# Patient Record
Sex: FEMALE | Race: WHITE | NOT HISPANIC OR LATINO | Employment: OTHER | URBAN - METROPOLITAN AREA
[De-identification: names, ages, dates, MRNs, and addresses within clinical notes are randomized per-mention and may not be internally consistent; named-entity substitution may affect disease eponyms.]

---

## 2021-04-11 ENCOUNTER — APPOINTMENT (EMERGENCY)
Dept: RADIOLOGY | Facility: HOSPITAL | Age: 71
DRG: 871 | End: 2021-04-11
Payer: MEDICARE

## 2021-04-11 ENCOUNTER — HOSPITAL ENCOUNTER (INPATIENT)
Facility: HOSPITAL | Age: 71
LOS: 2 days | Discharge: HOME | DRG: 871 | End: 2021-04-13
Attending: EMERGENCY MEDICINE | Admitting: FAMILY MEDICINE
Payer: MEDICARE

## 2021-04-11 ENCOUNTER — APPOINTMENT (EMERGENCY)
Dept: CT IMAGING | Facility: HOSPITAL | Age: 71
DRG: 871 | End: 2021-04-11
Payer: MEDICARE

## 2021-04-11 DIAGNOSIS — R65.20 SEVERE SEPSIS (HCC): ICD-10-CM

## 2021-04-11 DIAGNOSIS — E66.01 CLASS 3 SEVERE OBESITY DUE TO EXCESS CALORIES WITH SERIOUS COMORBIDITY IN ADULT, UNSPECIFIED BMI (HCC): ICD-10-CM

## 2021-04-11 DIAGNOSIS — J18.9 PNEUMONIA: Primary | ICD-10-CM

## 2021-04-11 DIAGNOSIS — A41.9 SEVERE SEPSIS (HCC): ICD-10-CM

## 2021-04-11 DIAGNOSIS — G93.41 ENCEPHALOPATHY IN SEPSIS: ICD-10-CM

## 2021-04-11 PROBLEM — M32.9 LUPUS (HCC): Status: ACTIVE | Noted: 2021-04-11

## 2021-04-11 PROBLEM — E11.9 TYPE 2 DIABETES MELLITUS (HCC): Status: ACTIVE | Noted: 2021-04-11

## 2021-04-11 PROBLEM — E87.8 ELECTROLYTE ABNORMALITY: Status: ACTIVE | Noted: 2021-04-11

## 2021-04-11 PROBLEM — R77.8 ELEVATED TROPONIN: Status: ACTIVE | Noted: 2021-04-11

## 2021-04-11 LAB
ALBUMIN SERPL BCP-MCNC: 4 G/DL (ref 3.5–5.7)
ALP SERPL-CCNC: 58 U/L (ref 55–165)
ALT SERPL W P-5'-P-CCNC: 30 U/L (ref 7–52)
AMMONIA PLAS-SCNC: 32 UMOL/L (ref 25–90)
AMPHETAMINES SERPL QL SCN: NEGATIVE
ANION GAP SERPL CALCULATED.3IONS-SCNC: 11 MMOL/L (ref 4–13)
APTT PPP: 22 SECONDS (ref 23–37)
AST SERPL W P-5'-P-CCNC: 26 U/L (ref 13–39)
BARBITURATES UR QL: NEGATIVE
BENZODIAZ UR QL: NEGATIVE
BILIRUB SERPL-MCNC: 0.7 MG/DL (ref 0.2–1)
BILIRUB UR QL STRIP: NEGATIVE
BUN SERPL-MCNC: 23 MG/DL (ref 7–25)
CALCIUM SERPL-MCNC: 9 MG/DL (ref 8.6–10.5)
CHLORIDE SERPL-SCNC: 97 MMOL/L (ref 98–107)
CLARITY UR: CLEAR
CO2 SERPL-SCNC: 30 MMOL/L (ref 21–31)
COCAINE UR QL: NEGATIVE
COLOR UR: YELLOW
CREAT SERPL-MCNC: 0.7 MG/DL (ref 0.6–1.2)
ERYTHROCYTE [DISTWIDTH] IN BLOOD BY AUTOMATED COUNT: 13.7 % (ref 11.5–14.5)
FLUAV RNA RESP QL NAA+PROBE: NEGATIVE
FLUBV RNA RESP QL NAA+PROBE: NEGATIVE
GFR SERPL CREATININE-BSD FRML MDRD: 89 ML/MIN/1.73SQ M
GLUCOSE SERPL-MCNC: 109 MG/DL (ref 65–140)
GLUCOSE SERPL-MCNC: 119 MG/DL (ref 65–140)
GLUCOSE SERPL-MCNC: 126 MG/DL (ref 65–140)
GLUCOSE SERPL-MCNC: 135 MG/DL (ref 65–99)
GLUCOSE UR STRIP-MCNC: NEGATIVE MG/DL
HCT VFR BLD AUTO: 37.4 % (ref 42–47)
HGB BLD-MCNC: 12.3 G/DL (ref 12–16)
HGB UR QL STRIP.AUTO: NEGATIVE
INR PPP: 1.04 (ref 0.84–1.19)
KETONES UR STRIP-MCNC: NEGATIVE MG/DL
LACTATE SERPL-SCNC: 1.6 MMOL/L (ref 0.5–2)
LACTATE SERPL-SCNC: 2.4 MMOL/L (ref 0.5–2)
LACTATE SERPL-SCNC: 3.8 MMOL/L (ref 0.5–2)
LEUKOCYTE ESTERASE UR QL STRIP: NEGATIVE
LIPASE SERPL-CCNC: 31 U/L (ref 11–82)
LYMPHOCYTES # BLD AUTO: 1.03 THOUSAND/UL (ref 0.6–4.47)
LYMPHOCYTES # BLD AUTO: 6 % (ref 20–51)
MAGNESIUM SERPL-MCNC: 1.1 MG/DL (ref 1.9–2.7)
MCH RBC QN AUTO: 28.8 PG (ref 26–34)
MCHC RBC AUTO-ENTMCNC: 33 G/DL (ref 31–37)
MCV RBC AUTO: 87 FL (ref 81–99)
METHADONE UR QL: NEGATIVE
MONOCYTES # BLD AUTO: 0.34 THOUSAND/UL (ref 0–1.22)
MONOCYTES NFR BLD AUTO: 2 % (ref 4–12)
NEUTS BAND NFR BLD MANUAL: 18 % (ref 0–8)
NEUTS SEG # BLD: 15.63 THOUSAND/UL (ref 1.81–6.82)
NEUTS SEG NFR BLD AUTO: 74 % (ref 42–75)
NITRITE UR QL STRIP: NEGATIVE
OPIATES UR QL SCN: NEGATIVE
OXYCODONE+OXYMORPHONE UR QL SCN: NEGATIVE
PCP UR QL: NEGATIVE
PH UR STRIP.AUTO: 5.5 [PH]
PLATELET # BLD AUTO: 241 THOUSANDS/UL (ref 149–390)
PLATELET # BLD AUTO: 245 THOUSANDS/UL (ref 149–390)
PLATELET BLD QL SMEAR: ADEQUATE
PMV BLD AUTO: 7.1 FL (ref 8.6–11.7)
POTASSIUM SERPL-SCNC: 3.3 MMOL/L (ref 3.5–5.5)
PROCALCITONIN SERPL-MCNC: 0.19 NG/ML
PROT SERPL-MCNC: 7 G/DL (ref 6.4–8.9)
PROT UR STRIP-MCNC: NEGATIVE MG/DL
PROTHROMBIN TIME: 13.5 SECONDS (ref 11.6–14.5)
RBC # BLD AUTO: 4.28 MILLION/UL (ref 3.9–5.2)
RBC MORPH BLD: NORMAL
RSV RNA RESP QL NAA+PROBE: NEGATIVE
SARS-COV-2 RNA RESP QL NAA+PROBE: NEGATIVE
SODIUM SERPL-SCNC: 138 MMOL/L (ref 134–143)
SP GR UR STRIP.AUTO: 1.02 (ref 1–1.03)
THC UR QL: NEGATIVE
TOTAL CELLS COUNTED SPEC: 99
TROPONIN I SERPL-MCNC: 0.04 NG/ML
TROPONIN I SERPL-MCNC: 0.04 NG/ML
TROPONIN I SERPL-MCNC: 0.05 NG/ML
UROBILINOGEN UR QL STRIP.AUTO: 0.2 E.U./DL
WBC # BLD AUTO: 17 THOUSAND/UL (ref 4.8–10.8)

## 2021-04-11 PROCEDURE — 99223 1ST HOSP IP/OBS HIGH 75: CPT | Performed by: FAMILY MEDICINE

## 2021-04-11 PROCEDURE — 80307 DRUG TEST PRSMV CHEM ANLYZR: CPT | Performed by: PHYSICIAN ASSISTANT

## 2021-04-11 PROCEDURE — 81003 URINALYSIS AUTO W/O SCOPE: CPT | Performed by: PHYSICIAN ASSISTANT

## 2021-04-11 PROCEDURE — 83735 ASSAY OF MAGNESIUM: CPT | Performed by: PHYSICIAN ASSISTANT

## 2021-04-11 PROCEDURE — 84145 PROCALCITONIN (PCT): CPT | Performed by: PHYSICIAN ASSISTANT

## 2021-04-11 PROCEDURE — 96367 TX/PROPH/DG ADDL SEQ IV INF: CPT

## 2021-04-11 PROCEDURE — 85049 AUTOMATED PLATELET COUNT: CPT | Performed by: FAMILY MEDICINE

## 2021-04-11 PROCEDURE — 1124F ACP DISCUSS-NO DSCNMKR DOCD: CPT | Performed by: PHYSICIAN ASSISTANT

## 2021-04-11 PROCEDURE — 94664 DEMO&/EVAL PT USE INHALER: CPT

## 2021-04-11 PROCEDURE — 85007 BL SMEAR W/DIFF WBC COUNT: CPT | Performed by: PHYSICIAN ASSISTANT

## 2021-04-11 PROCEDURE — 83605 ASSAY OF LACTIC ACID: CPT | Performed by: FAMILY MEDICINE

## 2021-04-11 PROCEDURE — 84484 ASSAY OF TROPONIN QUANT: CPT | Performed by: PHYSICIAN ASSISTANT

## 2021-04-11 PROCEDURE — 85610 PROTHROMBIN TIME: CPT | Performed by: PHYSICIAN ASSISTANT

## 2021-04-11 PROCEDURE — 85027 COMPLETE CBC AUTOMATED: CPT | Performed by: PHYSICIAN ASSISTANT

## 2021-04-11 PROCEDURE — 80053 COMPREHEN METABOLIC PANEL: CPT | Performed by: PHYSICIAN ASSISTANT

## 2021-04-11 PROCEDURE — 94760 N-INVAS EAR/PLS OXIMETRY 1: CPT

## 2021-04-11 PROCEDURE — 82948 REAGENT STRIP/BLOOD GLUCOSE: CPT

## 2021-04-11 PROCEDURE — 83690 ASSAY OF LIPASE: CPT | Performed by: PHYSICIAN ASSISTANT

## 2021-04-11 PROCEDURE — 87040 BLOOD CULTURE FOR BACTERIA: CPT | Performed by: PHYSICIAN ASSISTANT

## 2021-04-11 PROCEDURE — 84484 ASSAY OF TROPONIN QUANT: CPT | Performed by: FAMILY MEDICINE

## 2021-04-11 PROCEDURE — 83605 ASSAY OF LACTIC ACID: CPT | Performed by: PHYSICIAN ASSISTANT

## 2021-04-11 PROCEDURE — 87081 CULTURE SCREEN ONLY: CPT | Performed by: FAMILY MEDICINE

## 2021-04-11 PROCEDURE — 85730 THROMBOPLASTIN TIME PARTIAL: CPT | Performed by: PHYSICIAN ASSISTANT

## 2021-04-11 PROCEDURE — 36415 COLL VENOUS BLD VENIPUNCTURE: CPT | Performed by: PHYSICIAN ASSISTANT

## 2021-04-11 PROCEDURE — 93005 ELECTROCARDIOGRAM TRACING: CPT

## 2021-04-11 PROCEDURE — 71045 X-RAY EXAM CHEST 1 VIEW: CPT

## 2021-04-11 PROCEDURE — 94640 AIRWAY INHALATION TREATMENT: CPT

## 2021-04-11 PROCEDURE — 70450 CT HEAD/BRAIN W/O DYE: CPT

## 2021-04-11 PROCEDURE — 96368 THER/DIAG CONCURRENT INF: CPT

## 2021-04-11 PROCEDURE — 96365 THER/PROPH/DIAG IV INF INIT: CPT

## 2021-04-11 PROCEDURE — 82140 ASSAY OF AMMONIA: CPT | Performed by: PHYSICIAN ASSISTANT

## 2021-04-11 PROCEDURE — 99285 EMERGENCY DEPT VISIT HI MDM: CPT | Performed by: PHYSICIAN ASSISTANT

## 2021-04-11 PROCEDURE — 99285 EMERGENCY DEPT VISIT HI MDM: CPT

## 2021-04-11 PROCEDURE — 0241U HB NFCT DS VIR RESP RNA 4 TRGT: CPT | Performed by: PHYSICIAN ASSISTANT

## 2021-04-11 RX ORDER — TRAMADOL HYDROCHLORIDE 50 MG/1
50 TABLET ORAL EVERY 6 HOURS PRN
COMMUNITY

## 2021-04-11 RX ORDER — GABAPENTIN 300 MG/1
300 CAPSULE ORAL 3 TIMES DAILY
Status: DISCONTINUED | OUTPATIENT
Start: 2021-04-11 | End: 2021-04-13 | Stop reason: HOSPADM

## 2021-04-11 RX ORDER — CEFEPIME HYDROCHLORIDE 2 G/50ML
2000 INJECTION, SOLUTION INTRAVENOUS EVERY 12 HOURS
Status: DISCONTINUED | OUTPATIENT
Start: 2021-04-12 | End: 2021-04-13

## 2021-04-11 RX ORDER — CALCITONIN SALMON 200 [IU]/.09ML
1 SPRAY, METERED NASAL DAILY
Status: DISCONTINUED | OUTPATIENT
Start: 2021-04-11 | End: 2021-04-13 | Stop reason: HOSPADM

## 2021-04-11 RX ORDER — PREDNISONE 1 MG/1
2.5 TABLET ORAL DAILY
Status: DISCONTINUED | OUTPATIENT
Start: 2021-04-11 | End: 2021-04-13 | Stop reason: HOSPADM

## 2021-04-11 RX ORDER — LEVALBUTEROL 1.25 MG/.5ML
1.25 SOLUTION, CONCENTRATE RESPIRATORY (INHALATION)
Status: DISCONTINUED | OUTPATIENT
Start: 2021-04-11 | End: 2021-04-13 | Stop reason: HOSPADM

## 2021-04-11 RX ORDER — GABAPENTIN 300 MG/1
300 CAPSULE ORAL 3 TIMES DAILY
COMMUNITY

## 2021-04-11 RX ORDER — MAGNESIUM SULFATE HEPTAHYDRATE 40 MG/ML
2 INJECTION, SOLUTION INTRAVENOUS ONCE
Status: COMPLETED | OUTPATIENT
Start: 2021-04-11 | End: 2021-04-11

## 2021-04-11 RX ORDER — POTASSIUM CHLORIDE 14.9 MG/ML
20 INJECTION INTRAVENOUS ONCE
Status: COMPLETED | OUTPATIENT
Start: 2021-04-11 | End: 2021-04-11

## 2021-04-11 RX ORDER — PREDNISONE 2.5 MG
2.5 TABLET ORAL DAILY
COMMUNITY

## 2021-04-11 RX ORDER — SODIUM CHLORIDE, SODIUM GLUCONATE, SODIUM ACETATE, POTASSIUM CHLORIDE, MAGNESIUM CHLORIDE, SODIUM PHOSPHATE, DIBASIC, AND POTASSIUM PHOSPHATE .53; .5; .37; .037; .03; .012; .00082 G/100ML; G/100ML; G/100ML; G/100ML; G/100ML; G/100ML; G/100ML
125 INJECTION, SOLUTION INTRAVENOUS CONTINUOUS
Status: DISCONTINUED | OUTPATIENT
Start: 2021-04-11 | End: 2021-04-11

## 2021-04-11 RX ORDER — ATORVASTATIN CALCIUM 40 MG/1
40 TABLET, FILM COATED ORAL DAILY
Status: DISCONTINUED | OUTPATIENT
Start: 2021-04-11 | End: 2021-04-13 | Stop reason: HOSPADM

## 2021-04-11 RX ORDER — ASPIRIN 81 MG/1
81 TABLET ORAL DAILY
Status: DISCONTINUED | OUTPATIENT
Start: 2021-04-11 | End: 2021-04-13 | Stop reason: HOSPADM

## 2021-04-11 RX ORDER — ASPIRIN 81 MG/1
81 TABLET ORAL DAILY
COMMUNITY

## 2021-04-11 RX ORDER — ALBUTEROL SULFATE 2.5 MG/3ML
2.5 SOLUTION RESPIRATORY (INHALATION) EVERY 4 HOURS PRN
Status: DISCONTINUED | OUTPATIENT
Start: 2021-04-11 | End: 2021-04-13 | Stop reason: HOSPADM

## 2021-04-11 RX ORDER — ONDANSETRON 2 MG/ML
1 INJECTION INTRAMUSCULAR; INTRAVENOUS ONCE
Status: COMPLETED | OUTPATIENT
Start: 2021-04-11 | End: 2021-04-11

## 2021-04-11 RX ORDER — INSULIN DEGLUDEC INJECTION 100 U/ML
INJECTION, SOLUTION SUBCUTANEOUS
COMMUNITY

## 2021-04-11 RX ORDER — CEFEPIME HYDROCHLORIDE 1 G/50ML
1000 INJECTION, SOLUTION INTRAVENOUS ONCE
Status: COMPLETED | OUTPATIENT
Start: 2021-04-11 | End: 2021-04-11

## 2021-04-11 RX ORDER — EMPAGLIFLOZIN 10 MG/1
10 TABLET, FILM COATED ORAL EVERY MORNING
COMMUNITY

## 2021-04-11 RX ORDER — ATORVASTATIN CALCIUM 40 MG/1
40 TABLET, FILM COATED ORAL DAILY
COMMUNITY

## 2021-04-11 RX ORDER — TRAMADOL HYDROCHLORIDE 50 MG/1
50 TABLET ORAL EVERY 6 HOURS PRN
Status: DISCONTINUED | OUTPATIENT
Start: 2021-04-11 | End: 2021-04-13 | Stop reason: HOSPADM

## 2021-04-11 RX ADMIN — SODIUM CHLORIDE 1000 ML: 0.9 INJECTION, SOLUTION INTRAVENOUS at 14:09

## 2021-04-11 RX ADMIN — ATORVASTATIN CALCIUM 40 MG: 40 TABLET, FILM COATED ORAL at 17:55

## 2021-04-11 RX ADMIN — SODIUM CHLORIDE 1000 ML: 0.9 INJECTION, SOLUTION INTRAVENOUS at 13:22

## 2021-04-11 RX ADMIN — ASPIRIN 81 MG: 81 TABLET, COATED ORAL at 16:23

## 2021-04-11 RX ADMIN — CALCITONIN SALMON 1 SPRAY: 200 SPRAY, METERED NASAL at 16:23

## 2021-04-11 RX ADMIN — POTASSIUM CHLORIDE 20 MEQ: 14.9 INJECTION, SOLUTION INTRAVENOUS at 14:14

## 2021-04-11 RX ADMIN — SODIUM CHLORIDE, SODIUM GLUCONATE, SODIUM ACETATE, POTASSIUM CHLORIDE, MAGNESIUM CHLORIDE, SODIUM PHOSPHATE, DIBASIC, AND POTASSIUM PHOSPHATE 125 ML/HR: .53; .5; .37; .037; .03; .012; .00082 INJECTION, SOLUTION INTRAVENOUS at 15:38

## 2021-04-11 RX ADMIN — IPRATROPIUM BROMIDE 0.5 MG: 0.5 SOLUTION RESPIRATORY (INHALATION) at 20:55

## 2021-04-11 RX ADMIN — ENOXAPARIN SODIUM 40 MG: 40 INJECTION SUBCUTANEOUS at 16:23

## 2021-04-11 RX ADMIN — MAGNESIUM SULFATE HEPTAHYDRATE 2 G: 40 INJECTION, SOLUTION INTRAVENOUS at 16:23

## 2021-04-11 RX ADMIN — GABAPENTIN 300 MG: 300 CAPSULE ORAL at 20:43

## 2021-04-11 RX ADMIN — GABAPENTIN 300 MG: 300 CAPSULE ORAL at 16:23

## 2021-04-11 RX ADMIN — PREDNISONE 2.5 MG: 1 TABLET ORAL at 16:23

## 2021-04-11 RX ADMIN — LEVALBUTEROL HYDROCHLORIDE 1.25 MG: 1.25 SOLUTION, CONCENTRATE RESPIRATORY (INHALATION) at 20:55

## 2021-04-11 RX ADMIN — CEFEPIME HYDROCHLORIDE 1000 MG: 1 INJECTION, SOLUTION INTRAVENOUS at 13:22

## 2021-04-11 RX ADMIN — TRAMADOL HYDROCHLORIDE 50 MG: 50 TABLET, FILM COATED ORAL at 16:26

## 2021-04-11 RX ADMIN — VANCOMYCIN HYDROCHLORIDE 1500 MG: 1 INJECTION, POWDER, LYOPHILIZED, FOR SOLUTION INTRAVENOUS at 15:41

## 2021-04-11 RX ADMIN — INSULIN DETEMIR 50 UNITS: 100 INJECTION, SOLUTION SUBCUTANEOUS at 21:32

## 2021-04-11 RX ADMIN — MAGNESIUM SULFATE HEPTAHYDRATE 2 G: 40 INJECTION, SOLUTION INTRAVENOUS at 14:03

## 2021-04-11 NOTE — RESPIRATORY THERAPY NOTE
RT Protocol Note  Aubrey Agustin 72 y o  female MRN: [id number]  Unit/Bed#: ICU 01 Encounter: [id number]    Assessment    Principal Problem:    Sepsis ([place] [address] )  Active Problems:    Class 3 severe obesity in adult [facility])    Type 2 diabetes mellitus ([place] [address] )    Lupus ([place] [address] )    Electrolyte abnormality    Acute metabolic encephalopathy    Elevated troponin      Home Pulmonary Medications:  None  Home Devices/Therapy: Other (Comment)(None)    Past Medical History:   Diagnosis Date    Acquired curvature of spine     Arthritis     osterarthritis    Diabetes mellitus ([place] [address] )     Lupus ([place] [address] )      Social History     Socioeconomic History    Marital status:       Spouse name: None    Number of children: None    Years of education: None    Highest education level: None   Occupational History    None   Social Needs    Financial resource strain: None    Food insecurity     Worry: None     Inability: None    Transportation needs     Medical: None     Non-medical: None   Tobacco Use    Smoking status: Never Smoker    Smokeless tobacco: Never Used   Substance and Sexual Activity    Alcohol use: Not Currently     Frequency: Never    Drug use: Never    Sexual activity: Not Currently   Lifestyle    Physical activity     Days per week: None     Minutes per session: None    Stress: None   Relationships    Social connections     Talks on phone: None     Gets together: None     Attends Judaism service: None     Active member of club or organization: None     Attends meetings of clubs or organizations: None     Relationship status: None    Intimate partner violence     Fear of current or ex partner: None     Emotionally abused: None     Physically abused: None     Forced sexual activity: None   Other Topics Concern    None   Social History Narrative    None       Subjective         Objective    Physical Exam:   Assessment Type: Assess only  General Appearance: Alert, Awake  Respiratory Pattern: Normal  Chest Assessment: Chest expansion symmetrical  Bilateral Breath Sounds: Diminished, Rales, Expiratory wheezes(Bibasal rales and exp  whz  throughout  )  Cough: Non-productive, Moist, Strong  O2 Device: (S) 5 lpm NC(O2 decreased to 4 lpm NC @ this time  )    Vitals:  Blood pressure 120/57, pulse 98, temperature 100 2 °F (37 9 °C), temperature source Temporal, resp  rate 20, height 5' 2" (1 575 m), weight 97 2 kg (214 lb 4 6 oz), SpO2 93 %, not currently breastfeeding  Imaging and other studies: I have personally reviewed pertinent reports  O2 Device: (S) 5 lpm NC(O2 decreased to 4 lpm NC @ this time  )     Plan    Respiratory Plan: No distress/Pulmonary history        Resp Comments: (P) Pt  assessed  Pt  stated that her breathing is much better than it has been  Pt  stated that she is not SOB @ rest, but is with exertion  Pt  has a loose, nonproductive cough  Pt  feels very warm to the touch  Pt  does not wear O2, BiPap/CPAP or take any breathing medications @ home  Will order UDN treatments TID and PRN  Will continue to wean O2 down  Will continue to monitor and treat Pt , as ordered

## 2021-04-11 NOTE — PROGRESS NOTES
Vancomycin Assessment    Lit Dangelo is a 72 y o  female who is currently receiving vancomycin 1500 mg iv q 12 hrs for Pneumonia     Relevant clinical data and objective history reviewed:  Creatinine   Date Value Ref Range Status   04/11/2021 0 70 0 60 - 1 20 mg/dL Final     Comment:     Standardized to IDMS reference method     /63 (BP Location: Left arm)   Pulse 93   Temp 100 2 °F (37 9 °C) (Temporal)   Resp (!) 40   Ht 5' 2" (1 575 m)   Wt 97 2 kg (214 lb 4 6 oz)   SpO2 90%   Breastfeeding No   BMI 39 19 kg/m²   No intake/output data recorded  Lab Results   Component Value Date/Time    BUN 23 04/11/2021 01:03 PM    WBC 17 00 (H) 04/11/2021 01:03 PM    HGB 12 3 04/11/2021 01:03 PM    HCT 37 4 (L) 04/11/2021 01:03 PM    MCV 87 04/11/2021 01:03 PM     04/11/2021 05:49 PM     Temp Readings from Last 3 Encounters:   04/11/21 100 2 °F (37 9 °C) (Temporal)     Vancomycin Days of Therapy: 1    Assessment/Plan  The patient is currently on vancomycin utilizing scheduled dosing based on adjusted body weight (due to obesity)  Baseline risks associated with therapy include: pre-existing renal impairment, concomitant nephrotoxic medications, advanced age, and dehydration  The patient is currently receiving 1500 mg iv q 12 hrs and after clinical evaluation will be changed to 1250 mg iv q 12 hr   Pharmacy will also follow closely for s/sx of nephrotoxicity, infusion reactions, and appropriateness of therapy  BMP and CBC will be ordered per protocol  Plan for trough as patient approaches steady state, prior to the 4th  dose at approximately 03:00 on 04/13/21  Due to infection severity, will target a trough of 15-20 (appropriate for most indications)   Pharmacy will continue to follow the patients culture results and clinical progress daily      Liza Newton

## 2021-04-11 NOTE — ASSESSMENT & PLAN NOTE
· Sepsis parameters have significantly improved but not completely resolved  · Patient is no longer tachypneic, and or tachycardic  · Lactic acidosis has resolved, however white blood cell count is up to 29 5  · Unspecified exact etiology  · Procalcitonin testing is 0 19, will continue to trend  · Continue vancomycin, and cefepime day 2, will add metronidazole since the patient reports intermittent episodes of diarrhea which she attributes to metformin use  · Will check stool for C diff, fecal leukocytes, as well as a stool culture  · Additionally, at this time will check a CT scan of the chest, abdomen and pelvis to rule out any other potential source of sepsis  · Continue present management here in the step-down unit

## 2021-04-11 NOTE — ASSESSMENT & PLAN NOTE
· Troponin trend as follows:  0 04, 0 05, 0 04  · No true rise and or fall  · Suspect that this is a non MI related elevated troponinemia secondary to sepsis  · EKGs grossly within normal limits, patient is asymptomatic, and denies ever having had any recent chest pain

## 2021-04-11 NOTE — ASSESSMENT & PLAN NOTE
Troponin 0 04  No chest pain  EKG:normal sinus rhythm nonischemic  Will trend troponin  Likely secondary to sepsis

## 2021-04-11 NOTE — ASSESSMENT & PLAN NOTE
Lab Results   Component Value Date    HGBA1C 8 9 (H) 01/08/2021       Recent Labs     04/11/21  1300   POCGLU 119       Blood Sugar Average: Last 72 hrs:  (P) 119   Will hold oral antihyperglycemics  Will check blood glucose 2 hours before meals and bedtime  Will continue long-acting insulin  Will cover with insulin sliding scale

## 2021-04-11 NOTE — ASSESSMENT & PLAN NOTE
· Currently resolved - most likely secondary to the sepsis which has improved with IV antibiotics  · CT scan of the head was within normal limits  · Patient is AAO x3  · Will monitor closely

## 2021-04-11 NOTE — ASSESSMENT & PLAN NOTE
Criteria met by tachycardia, white blood count 17, lactate 2 4, source being likely right middle lobe pneumonia  Patient started on vancomycin and cefepime  Trend lactate  Received 2 L of IV fluids    Will not give full 30 cc/kilos since patient seems to be slightly fluid overloaded  Follow-up blood cultures  Follow-up procalcitonin

## 2021-04-11 NOTE — SEPSIS NOTE
Sepsis Note   Jasmin Christianson 72 y o  female MRN: [id number]  Unit/Bed#: ED 06 Encounter: [id number]      qSOFA     Row Name 04/11/21 1445 04/11/21 1415 04/11/21 1400 04/11/21 1241       Altered mental status GCS < 15  --  --  --  --     Respiratory Rate > / =22  --  0  0  0     Systolic BP < / =135  0  0  1  0     Q Sofa Score  0  0  1  0         Initial Sepsis Screening     Row Name 04/11/21 1450                Is the patient's history suggestive of a new or worsening infection? (!) Yes (Proceed)  -WG        Suspected source of infection  pneumonia;urinary tract infection  -WG        Are two or more of the following signs & symptoms of infection both present and new to the patient?  --        Indicate SIRS criteria  Tachycardia > 90 bpm;Leukopenia (WBC < 4000 IJL); Altered mental status  -WG        If the answer is yes to both questions, suspicion of sepsis is present  --        If severe sepsis is present AND tissue hypoperfusion perists in the hour after fluid resuscitation or lactate > 4, the patient meets criteria for SEPTIC SHOCK  --        Are any of the following organ dysfunction criteria present within 6 hours of suspected infection and SIRS criteria that are NOT considered to be chronic conditions? --        Organ dysfunction  --        Date of presentation of severe sepsis  04/11/21  -WG        Time of presentation of severe sepsis  1303  -WG        Tissue hypoperfusion persists in the hour after crystalloid fluid administration, evidenced, by either:  --        Was hypotension present within one hour of the conclusion of crystalloid fluid administration?   No  -WG        Date of presentation of septic shock  --        Time of presentation of septic shock  --          User Key  (r) = Recorded By, (t) = Taken By, (c) = Cosigned By    [address] Name Provider Type    WG Danni Collins PA-C Physician Assistant

## 2021-04-11 NOTE — ED PROVIDER NOTES
History  Chief Complaint   Patient presents with    Shortness of Breath     Patient reports shortness of breath since this morning  Patient reports nausea and vomiting      72year old female history of insulin-dependent type 2 diabetes presents accompanied by her daughter via EMS complaining of altered mental status  The daughter provides majority of the history stating that she was getting ready to go to [employer] this morning when her mother was not acting right saying words that don't make sense such as "we have to get to the causalities"  She appears confused and she states did not look well per the daughter citing shortness of breath, and a cough fit that resulted in the patient vomiting  Patient then sat on the toilet after episode of posttussive emesis and had a lot of watery nonbloody diarrhea  They then had to lift the patient off the toilet to bring her to the kitchen with a stated that her weakness had then progressed  She has been vaccinated twice for COVID-19  They deny any recent travel or sick contacts  The patient denies any fevers, chills, chest pain, abdominal pain, urinary burning or frequency or any recent lower leg pain or swelling or any history of DVT/PE  Patient is moving all 4 extremities  No facial droop or any other focal deficit  Patient states that the month is June and that the year is 2061  She does know that she is in the hospital           Prior to Admission Medications   Prescriptions Last Dose Informant Patient Reported? Taking?    Empagliflozin (Jardiance) 10 MG TABS   Yes Yes   Sig: Take 10 mg by mouth every morning   Insulin Degludec [place] [place]) 100 UNIT/ML SOLN   Yes Yes   Sig: Inject under the skin   alendronate-cholecalciferol (FOSAMAX PLUS D)  MG-UNIT per tablet   Yes Yes   Sig: Take 1 tablet by mouth once a week   aspirin (ECOTRIN LOW STRENGTH) 81 mg EC tablet   Yes Yes   Sig: Take 81 mg by mouth daily   atorvastatin (LIPITOR) 40 mg tablet   Yes Yes   Sig: Take 40 mg by mouth daily   gabapentin (NEURONTIN) 300 mg capsule   Yes Yes   Sig: Take 300 mg by mouth 3 (three) times a day   metFORMIN (GLUCOPHAGE) 1000 MG tablet   Yes Yes   Sig: Take 1,000 mg by mouth 2 (two) times a day with meals   predniSONE 2 5 mg tablet   Yes Yes   Sig: Take 2 5 mg by mouth daily   traMADol (ULTRAM) 50 mg tablet   Yes Yes   Sig: Take 50 mg by mouth every 6 (six) hours as needed for moderate pain      Facility-Administered Medications: None       History reviewed  No pertinent past medical history  History reviewed  No pertinent surgical history  History reviewed  No pertinent family history  I have reviewed and agree with the history as documented  E-Cigarette/Vaping     E-Cigarette/Vaping Substances    Nicotine No      Social History     Tobacco Use    Smoking status: Never Smoker    Smokeless tobacco: Never Used   Substance Use Topics    Alcohol use: Not Currently     Frequency: Never    Drug use: Not on file       Review of Systems   Constitutional: Negative for chills, fatigue and fever  HENT: Negative for ear pain and sore throat  Eyes: Negative for pain  Respiratory: Negative for cough, shortness of breath and wheezing  Cardiovascular: Negative for chest pain, palpitations and leg swelling  Gastrointestinal: Positive for diarrhea, nausea and vomiting  Negative for abdominal pain and constipation  Endocrine: Negative for polyuria  Genitourinary: Negative for dysuria and pelvic pain  Musculoskeletal: Negative for arthralgias, myalgias, neck pain and neck stiffness  Skin: Negative for rash  Neurological: Positive for weakness  Negative for dizziness, syncope, light-headedness and headaches  Psychiatric/Behavioral: Positive for confusion  All other systems reviewed and are negative  Physical Exam  Physical Exam  Constitutional:       Appearance: She is well-developed  She is obese  She is ill-appearing     HENT:      Head: Normocephalic and atraumatic  Mouth/Throat:      Pharynx: No oropharyngeal exudate  Neck:      Musculoskeletal: Normal range of motion  Cardiovascular:      Rate and Rhythm: Normal rate and regular rhythm  Heart sounds: Normal heart sounds  Pulmonary:      Effort: Pulmonary effort is normal       Breath sounds: Normal breath sounds  Abdominal:      General: Bowel sounds are normal       Palpations: Abdomen is soft  Tenderness: There is no abdominal tenderness  Musculoskeletal: Normal range of motion  Skin:     General: Skin is warm  Capillary Refill: Capillary refill takes 2 to 3 seconds  Neurological:      General: No focal deficit present  Mental Status: She is alert  Cranial Nerves: No cranial nerve deficit        Comments: Oriented to person and place  No facial droop, dysarthria         Vital Signs  ED Triage Vitals [04/11/21 1241]   Temperature Pulse Respirations Blood Pressure SpO2   98 4 °F (36 9 °C) 94 16 109/56 95 %      Temp Source Heart Rate Source Patient Position - Orthostatic VS BP Location FiO2 (%)   Temporal Monitor Sitting Right arm --      Pain Score       --           Vitals:    04/11/21 1241 04/11/21 1400 04/11/21 1415 04/11/21 1445   BP: 109/56 93/51 132/61 128/57   Pulse: 94 94 91    Patient Position - Orthostatic VS: Sitting   Sitting         Visual Acuity      ED Medications  Medications   potassium chloride 20 mEq IVPB (premix) (20 mEq Intravenous New Bag 4/11/21 1414)   vancomycin (VANCOCIN) 1,500 mg in sodium chloride 0 9 % 250 mL IVPB (has no administration in time range)   magnesium sulfate 2 g/50 mL IVPB (premix) 2 g (has no administration in time range)   ondansetron (FOR EMS ONLY) (ZOFRAN) 4 mg/2 mL injection 4 mg (0 mg Does not apply Given to EMS 4/11/21 1322)   cefepime (MAXIPIME) IVPB (premix in dextrose) 1,000 mg 50 mL (0 mg Intravenous Stopped 4/11/21 1352)   sodium chloride 0 9 % bolus 1,000 mL (1,000 mL Intravenous New Bag 4/11/21 1322)   magnesium sulfate 2 g/50 mL IVPB (premix) 2 g (2 g Intravenous New Bag 4/11/21 1403)   sodium chloride 0 9 % bolus 1,000 mL (1,000 mL Intravenous New Bag 4/11/21 1409)       Diagnostic Studies  Results Reviewed     Procedure Component Value Units Date/Time    Lactic acid 2 Hours [[id number]] Collected: 04/11/21 1504    Lab Status: In process Specimen: Blood from Hand, Left Updated: 04/11/21 1510    Rapid drug screen, urine [[id number]]  (Normal) Collected: 04/11/21 1402    Lab Status: Final result Specimen: Urine, Catheter Updated: 04/11/21 1433     Amph/Meth UR Negative     Barbiturate Ur Negative     Benzodiazepine Urine Negative     Cocaine Urine Negative     Methadone Urine Negative     Opiate Urine Negative     PCP Ur Negative     THC Urine Negative     Oxycodone Urine Negative    Narrative:      FOR MEDICAL PURPOSES ONLY  IF CONFIRMATION NEEDED PLEASE CONTACT THE LAB WITHIN 5 DAYS      Drug Screen Cutoff Levels:  AMPHETAMINE/METHAMPHETAMINES  1000 ng/mL  BARBITURATES     200 ng/mL  BENZODIAZEPINES     200 ng/mL  COCAINE      300 ng/mL  METHADONE      300 ng/mL  OPIATES      300 ng/mL  PHENCYCLIDINE     25 ng/mL  THC       50 ng/mL  OXYCODONE      100 ng/mL    UA w Reflex to Microscopic w Reflex to Culture [[id number]]  (Normal) Collected: 04/11/21 1402    Lab Status: Final result Specimen: Urine, Clean Catch Updated: 04/11/21 1422     Color, UA Yellow     Clarity, UA Clear     Specific [place], UA 1 020     pH, UA 5 5     Leukocytes, UA Negative     Nitrite, UA Negative     Protein, UA Negative mg/dl      Glucose, UA Negative mg/dl      Ketones, UA Negative mg/dl      Urobilinogen, UA 0 2 E U /dl      Bilirubin, UA Negative     Blood, UA Negative    COVID19, Influenza A/B, RSV PCR, [facility] [[id number]]  (Normal) Collected: 04/11/21 1302    Lab Status: Final result Specimen: Nasopharyngeal Swab Updated: 04/11/21 1407     SARS-CoV-2 Negative     INFLUENZA A PCR Negative     INFLUENZA B PCR Negative     RSV PCR Negative Narrative: This test has been authorized by FDA under an EUA (Emergency Use Assay) for use by authorized laboratories  Clinical caution and judgement should be used with the interpretation of these results with consideration of the clinical impression and other laboratory testing  Testing reported as "Positive" or "Negative" has been proven to be accurate according to standard laboratory validation requirements  All testing is performed with control materials showing appropriate reactivity at standard intervals  Manual Differential (Non Wam) [[id number]]  (Abnormal) Collected: 04/11/21 1303    Lab Status: Final result Specimen: Blood from Arm, Left Updated: 04/11/21 1400     Segmented % 74 %      Bands % 18 %      Lymphocytes % 6 %      Monocytes % 2 %      Neutrophils Absolute 15 63 Thousand/uL      Lymphocytes Absolute 1 03 Thousand/uL      Monocytes Absolute 0 34 Thousand/uL      Total Counted 99     RBC Morphology Normal     Platelet Estimate Adequate    Lactic acid [[id number]]  (Abnormal) Collected: 04/11/21 1302    Lab Status: Final result Specimen: Blood from Arm, Left Updated: 04/11/21 1350     LACTIC ACID 2 4 mmol/L     Narrative:      Result may be elevated if tourniquet was used during collection      Comprehensive metabolic panel [[id number]]  (Abnormal) Collected: 04/11/21 1303    Lab Status: Final result Specimen: Blood from Arm, Left Updated: 04/11/21 1349     Sodium 138 mmol/L      Potassium 3 3 mmol/L      Chloride 97 mmol/L      CO2 30 mmol/L      ANION GAP 11 mmol/L      BUN 23 mg/dL      Creatinine 0 70 mg/dL      Glucose 135 mg/dL      Calcium 9 0 mg/dL      AST 26 U/L      ALT 30 U/L      Alkaline Phosphatase 58 U/L      Total Protein 7 0 g/dL      Albumin 4 0 g/dL      Total Bilirubin 0 70 mg/dL      eGFR 89 ml/min/1 73sq m     Narrative:      [place] guidelines for Chronic Kidney Disease (CKD):     Stage 1 with normal or high GFR (GFR > 90 mL/min/1 73 square meters)    Stage 2 Mild CKD (GFR = 60-89 mL/min/1 73 square meters)    Stage 3A Moderate CKD (GFR = 45-59 mL/min/1 73 square meters)    Stage 3B Moderate CKD (GFR = 30-44 mL/min/1 73 square meters)    Stage 4 Severe CKD (GFR = 15-29 mL/min/1 73 square meters)    Stage 5 End Stage CKD (GFR <15 mL/min/1 73 square meters)  Note: GFR calculation is accurate only with a steady state creatinine    Magnesium [[id number]]  (Abnormal) Collected: 04/11/21 1303    Lab Status: Final result Specimen: Blood from Arm, Left Updated: 04/11/21 1349     Magnesium 1 1 mg/dL     Troponin I [[id number]]  (Abnormal) Collected: 04/11/21 1303    Lab Status: Final result Specimen: Blood from Arm, Left Updated: 04/11/21 1349     Troponin I 0 04 ng/mL     Lipase [[id number]]  (Normal) Collected: 04/11/21 1303    Lab Status: Final result Specimen: Blood from Arm, Left Updated: 04/11/21 1342     Lipase 31 u/L     Ammonia [[id number]]  (Normal) Collected: 04/11/21 1303    Lab Status: Final result Specimen: Blood from Arm, Left Updated: 04/11/21 1340     Ammonia 32 umol/L     Protime-INR [[id number]]  (Normal) Collected: 04/11/21 1303    Lab Status: Final result Specimen: Blood from Arm, Left Updated: 04/11/21 1335     Protime 13 5 seconds      INR 1 04    APTT [[id number]]  (Abnormal) Collected: 04/11/21 1303    Lab Status: Final result Specimen: Blood from Arm, Left Updated: 04/11/21 1335     PTT 22 seconds     CBC and differential [[id number]]  (Abnormal) Collected: 04/11/21 1303    Lab Status: Final result Specimen: Blood from Arm, Left Updated: 04/11/21 1328     WBC 17 00 Thousand/uL      RBC 4 28 Million/uL      Hemoglobin 12 3 g/dL      Hematocrit 37 4 %      MCV 87 fL      MCH 28 8 pg      MCHC 33 0 g/dL      RDW 13 7 %      MPV 7 1 fL      Platelets 274 Thousands/uL     Procalcitonin with AM Reflex [[id number]] Collected: 04/11/21 1303    Lab Status:  In process Specimen: Blood from Arm, Left Updated: 04/11/21 1317    Blood culture #2 [[id number]] Collected: 04/11/21 1303    Lab Status: In process Specimen: Blood from Arm, Left Updated: 04/11/21 1316    Blood culture #1 [[id number]] Collected: 04/11/21 1310    Lab Status: In process Specimen: Blood from Arm, Left Updated: 04/11/21 1316    Fingerstick Glucose (POCT) [[id number]]  (Normal) Collected: 04/11/21 1300    Lab Status: Final result Updated: 04/11/21 1301     POC Glucose 119 mg/dl                  CT head without contrast   Final Result by Lynne Carlson MD (04/11 1344)      No acute intracranial abnormality  Workstation performed: [id number]         XR chest portable   ED Interpretation by Ankush Gannon PA-C (04/11 1435)   Suspected RML infiltrate                 Procedures  ECG 12 Lead Documentation Only    Date/Time: 4/11/2021 2:57 PM  Performed by: Ankush Gannon PA-C  Authorized by: Ankush Gannon PA-C     ECG reviewed by me, the ED Provider: yes    Patient location:  ED  Previous ECG:     Previous ECG:  Compared to current    Similarity:  No change    Comparison to cardiac monitor: Yes    Interpretation:     Interpretation: normal    Rate:     ECG rate:  95    ECG rate assessment: normal    Rhythm:     Rhythm: sinus rhythm    Ectopy:     Ectopy: none    QRS:     QRS axis:  Normal  Conduction:     Conduction: normal    ST segments:     ST segments:  Normal  T waves:     T waves: normal    Comments:      No evidence of acute cardiac ischemia             ED Course  ED Course as of Apr 11 1514   Sun Apr 11, 2021   1431 Second liter of fluid started in second peripheral line, placed in slight trendelenburg  Repeat /61   Blood Pressure: 93/51   1431 Result noted  Likely due sepsis, not cardiac ischemia  EKG non-ischemic appearing   Troponin I(!): 0 04   1509 Patient now appears more alert and oriented than when she came in  Lungs had some crackles in upper lung fields that seemed to clear with cough   Also complaining of acute on chronic back pain lying in bed  Info relayed to Dr SHERMAN  Initial Sepsis Screening     Row Name 04/11/21 0283                Is the patient's history suggestive of a new or worsening infection? (!) Yes (Proceed)  -WG        Suspected source of infection  pneumonia;urinary tract infection  -WG        Are two or more of the following signs & symptoms of infection both present and new to the patient?  --        Indicate SIRS criteria  Tachycardia > 90 bpm;Leukopenia (WBC < 4000 IJL); Altered mental status  -WG        If the answer is yes to both questions, suspicion of sepsis is present  --        If severe sepsis is present AND tissue hypoperfusion perists in the hour after fluid resuscitation or lactate > 4, the patient meets criteria for SEPTIC SHOCK  --        Are any of the following organ dysfunction criteria present within 6 hours of suspected infection and SIRS criteria that are NOT considered to be chronic conditions? --        Organ dysfunction  --        Date of presentation of severe sepsis  04/11/21  -        Time of presentation of severe sepsis  1303  -        Tissue hypoperfusion persists in the hour after crystalloid fluid administration, evidenced, by either:  --        Was hypotension present within one hour of the conclusion of crystalloid fluid administration?   No  -WG        Date of presentation of septic shock  --        Time of presentation of septic shock  --          User Key  (r) = Recorded By, (t) = Taken By, (c) = Cosigned By    [address] Name Provider Type    DEMARCUS Miranda PA-C Physician Assistant           Default Flowsheet Data (last 720 hours)      Sepsis Reassess     [address] [place] [address] Name 04/11/21 7800                   Repeat Volume Status and Tissue Perfusion Assessment Performed    Repeat Volume Status and Tissue Perfusion Assessment Performed  Yes  -WG           Volume Status and Tissue Perfusion Post Fluid Resuscitation * Must Document All *    Vital Signs Reviewed (HR, RR, BP, T) Yes  -WG        Shock Index Reviewed  Yes  -WG        Arterial Oxygen Saturation Reviewed (POx, SaO2 or SpO2)  --        Cardio  Normal S1/S2; Regular rate and rhythm  -WG        Pulmonary  Normal effort  -WG        Capillary Refill  Sluggish  -WG        Peripheral Pulses  Radial  -WG        Peripheral Pulse  +2  -WG        Skin  Warm  -WG        Urine output assessed  --           *OR*   Intensive Monitoring- Must Document One of the Following Four *:    Vital Signs Reviewed  Yes  -WG        * Central Venous Pressure (CVP or RAP)  --        * Central Venous Oxygen (SVO2, ScvO2 or Oxygen saturation via central catheter)  --        * Bedside Cardiovascular US in IVC diameter and % collapse  --        * Passive Leg Raise OR Crystalloid Challenge  --          User Key  (r) = Recorded By, (t) = Taken By, (c) = Cosigned By    Initials Name Provider Type     Jeremy Pack PA-C Physician Assistant                      MDM  Number of Diagnoses or Management Options  Encephalopathy in sepsis:   Pneumonia:   Severe sepsis [facility]):   Diagnosis management comments: Patient presented with generalized weakness, altered mental status, shortness of breath, coughing, vomiting and diarrhea  Differential diagnosis included but was not limited to urosepsis, pneumonia, COVID-19, intracranial hemorrhage  Patient found to have leukocytosis and lactic acidosis  Although initial vital signs were within normal limits, suspected sepsis due to relatively abrupt onset of symptoms and severity of weakness and mental status  No focal neuro deficit on exam  NIH of 0  Broad-spectrum antibiotics started immediately with cefepime and normal saline  Patient had 1 episode of hypotension with a blood pressure of 93/51 as discussed in the ED course  Likely source of infection RML infiltrate  Will be admitted to stepdown level 1  Patient and daughter agreeable to plan        Disposition  Final diagnoses:   Pneumonia   Severe sepsis ([address] ) Encephalopathy in sepsis     Time reflects when diagnosis was documented in both MDM as applicable and the Disposition within this note     Time User Action Codes Description Comment    4/11/2021  2:30 PM Maxcine Finn [J18 9] Pneumonia     4/11/2021  2:30 PM Renell Reagin Add [A41 9,  R65 20] Severe sepsis ([facility] [address] )     4/11/2021  2:30 PM Renell Reagin Add [G93 41] Encephalopathy in sepsis       ED Disposition     ED Disposition Condition Date/Time Comment    Admit Stable Sun Apr 11, 2021  2:30 PM Case was discussed with Dr Robyne Sicard and the patient's admission status was agreed to be Admission Status: inpatient status to the service of Dr Robyne Sicard          Follow-up Information    None         Patient's Medications   Discharge Prescriptions    No medications on file     No discharge procedures on file      PDMP Review     None          ED Provider  Electronically Signed by           Kristine Pickett PA-C  04/11/21 8910

## 2021-04-11 NOTE — H&P
[address] [facility]  H&P- Chelsea Taylor 1950, 72 y o  female MRN: [id number]  Unit/Bed#: ICU 01 Encounter: [id number]  Primary Care Provider: Kendra Kyle MD   Date and time admitted to hospital: 4/11/2021 12:36 PM    * Sepsis [facility])  Assessment & Plan  Criteria met by tachycardia, white blood count 17, lactate 2 4, unclear source at this point   Patient started on vancomycin and cefepime  Trend lactate  Received 2 L of IV fluids  Will not give full 30 cc/kilos since patient seems to be slightly fluid overloaded  Follow-up blood cultures  Follow-up procalcitonin      Elevated troponin  Assessment & Plan  Troponin 0 04  No chest pain  EKG:normal sinus rhythm nonischemic  Will trend troponin  Likely secondary to sepsis    Acute metabolic encephalopathy  Assessment & Plan  Likely related to sepsis  CT head negative  Patient oriented to person and place but not time    Electrolyte abnormality  Assessment & Plan  Replete p r n  Lupus ([address] )  Assessment & Plan  Continue prednisone    Class 3 severe obesity in adult [facility])  Assessment & Plan  Diet and exercise counseling prior discharge    VTE Prophylaxis: Enoxaparin (Lovenox)  / sequential compression device   Code Status:  Full code  POLST: POLST form is not discussed and not completed at this time  Discussion with family:  With patient and patient's daughter    Anticipated Length of Stay:  Patient will be admitted on an Inpatient basis with an anticipated length of stay of  more than 2 midnights  Justification for Hospital Stay:  Sepsis    Total Time for Visit, including Counseling / Coordination of Care: 45 minutes  Greater than 50% of this total time spent on direct patient counseling and coordination of care      Chief Complaint:   altered mental status    History of Present Illness:    Chelsea Taylor is a 72 y o  female past medical history significant for diabetes, morbid obesity, lupus who presents with altered mental status  As per daughter patient appeared to be confused this morning  Patient appears short of breath, vomiting and having diarrhea  No complaints of weakness     No fevers no chills  No chest pain no abdominal pain  She had 2 doses of vaccine for COVID 19  Review of Systems:    Review of Systems   Constitutional: Positive for fatigue  Negative for chills, fever and unexpected weight change  HENT: Negative for hearing loss, nosebleeds and sore throat  Eyes: Negative for pain, redness and visual disturbance  Respiratory: Positive for shortness of breath  Negative for cough and wheezing  Cardiovascular: Negative for chest pain, palpitations and leg swelling  Gastrointestinal: Positive for diarrhea and vomiting  Negative for abdominal pain and nausea  Endocrine: Negative for polydipsia and polyuria  Genitourinary: Negative for dysuria and hematuria  Musculoskeletal: Negative for arthralgias, joint swelling and myalgias  Skin: Negative for rash and wound  Neurological: Negative for dizziness, numbness and headaches  Psychiatric/Behavioral: Positive for confusion  Negative for decreased concentration and suicidal ideas  The patient is not nervous/anxious  Past Medical and Surgical History:     Past Medical History:   Diagnosis Date    Acquired curvature of spine     Arthritis     osterarthritis    [facility]     Lupus ([address] )        Past Surgical History:   Procedure Laterality Date    CARDIAC SURGERY      cardiac stent 6 yrs ago    HYSTERECTOMY      30 yrs ago       Meds/Allergies:    Prior to Admission medications    Medication Sig Start Date End Date Taking?  Authorizing Provider   alendronate-cholecalciferol (FOSAMAX PLUS D)  MG-UNIT per tablet Take 1 tablet by mouth once a week   Yes Historical Provider, MD   aspirin (ECOTRIN LOW STRENGTH) 81 mg EC tablet Take 81 mg by mouth daily   Yes Historical Provider, MD   atorvastatin (LIPITOR) 40 mg tablet Take 40 mg by mouth daily   Yes Historical Provider, MD   Empagliflozin (Jardiance) 10 MG TABS Take 10 mg by mouth every morning   Yes Historical Provider, MD   gabapentin (NEURONTIN) 300 mg capsule Take 300 mg by mouth 3 (three) times a day   Yes Historical Provider, MD   Insulin Degludec Lila Souza) 100 UNIT/ML SOLN Inject under the skin   Yes Historical Provider, MD   metFORMIN (GLUCOPHAGE) 1000 MG tablet Take 1,000 mg by mouth 2 (two) times a day with meals   Yes Historical Provider, MD   predniSONE 2 5 mg tablet Take 2 5 mg by mouth daily   Yes Historical Provider, MD   traMADol (ULTRAM) 50 mg tablet Take 50 mg by mouth every 6 (six) hours as needed for moderate pain   Yes Historical Provider, MD     I have reviewed home medications with patient personally  Allergies: No Known Allergies    Social History:     Marital Status:    Occupation:   Patient Pre-hospital Living Situation:  Living with daughter  Patient Pre-hospital Level of Mobility:  Normal  Patient Pre-hospital Diet Restrictions:  None  Substance Use History:   Social History     Substance and Sexual Activity   Alcohol Use Not Currently    Frequency: Never     Social History     Tobacco Use   Smoking Status Never Smoker   Smokeless Tobacco Never Used     Social History     Substance and Sexual Activity   Drug Use Never       Family History:    non-contributory    Physical Exam:     Vitals:   Blood Pressure: 132/63 (04/11/21 1545)  Pulse: 93 (04/11/21 1545)  Temperature: 100 2 °F (37 9 °C) (04/11/21 1530)  Temp Source: Temporal (04/11/21 1530)  Respirations: (!) 40 (04/11/21 1545)  Height: 5' 2" (157 5 cm) (04/11/21 1545)  Weight - Scale: 97 2 kg (214 lb 4 6 oz) (04/11/21 1545)  SpO2: 90 % (04/11/21 1545)    Physical Exam  Vitals signs and nursing note reviewed  Constitutional:       General: She is not in acute distress  HENT:      Head: Normocephalic  Nose: Nose normal       Mouth/Throat:      Pharynx: Oropharynx is clear     Eyes: Conjunctiva/sclera: Conjunctivae normal    Cardiovascular:      Rate and Rhythm: Normal rate and regular rhythm  Heart sounds: No murmur  Pulmonary:      Effort: Pulmonary effort is normal  No respiratory distress  Breath sounds: Normal breath sounds  No wheezing  Comments: Few bilateral crackles  Abdominal:      General: There is no distension  Tenderness: There is no abdominal tenderness  There is no guarding  Musculoskeletal: Normal range of motion  General: No swelling  Right lower leg: No edema  Skin:     General: Skin is warm  Capillary Refill: Capillary refill takes less than 2 seconds  Neurological:      General: No focal deficit present  Mental Status: She is alert  She is disoriented  Cranial Nerves: No cranial nerve deficit  Comments: Oriented to place and person but not time   Psychiatric:         Mood and Affect: Mood normal              Additional Data:     Lab Results: I have personally reviewed pertinent reports  Results from last 7 days   Lab Units 04/11/21  1303   WBC Thousand/uL 17 00*   HEMOGLOBIN g/dL 12 3   HEMATOCRIT % 37 4*   PLATELETS Thousands/uL 245   BANDS PCT % 18*   LYMPHO PCT % 6*   MONO PCT % 2*     Results from last 7 days   Lab Units 04/11/21  1303   SODIUM mmol/L 138   POTASSIUM mmol/L 3 3*   CHLORIDE mmol/L 97*   CO2 mmol/L 30   BUN mg/dL 23   CREATININE mg/dL 0 70   ANION GAP mmol/L 11   CALCIUM mg/dL 9 0   ALBUMIN g/dL 4 0   TOTAL BILIRUBIN mg/dL 0 70   ALK PHOS U/L 58   ALT U/L 30   AST U/L 26   GLUCOSE RANDOM mg/dL 135*     Results from last 7 days   Lab Units 04/11/21  1303   INR  1 04     Results from last 7 days   Lab Units 04/11/21  1300   POC GLUCOSE mg/dl 119         Results from last 7 days   Lab Units 04/11/21  1504 04/11/21  1302   LACTIC ACID mmol/L 3 8* 2 4*       Imaging: I have personally reviewed pertinent reports        CT head without contrast   Final Result by John Baez MD (04/11 1344) No acute intracranial abnormality  Workstation performed: [id number]         XR chest portable   ED Interpretation by Janny Miranda PA-C (04/11 1435)   Suspected RML infiltrate      Final Result by Jesus Elliott MD (04/11 1640)      No active pulmonary disease on examination which is somewhat limited secondary to low lung volumes  Workstation performed: [id number]             EKG, Pathology, and Other Studies Reviewed on Admission:   · EKG:  Sinus rhythm, nonischemic    Allscripts / Epic Records Reviewed: Yes     ** Please Note: This note has been constructed using a voice recognition system   **

## 2021-04-11 NOTE — NURSING NOTE
Patient admitted to ICU #1  Patient awake when calling name  Oriented to self and situation only  Easily drifts back to sleep  Crackles noted throughout on arrival to unit  Desaturations noted on 2L 02 NC  Titrated 02 to 5L NC  Maintaining oxygen saturation 91-94% on same  Hospitalist made aware of increase in oxygen demands  IVF D/C'd  Voiding on bedpan without difficulty  C/O back pain  Medicated with Tramadol  Effectively relieved pain with same  Vital signs stable  Multiple family members at bedside  Attempted to orient patient to unit and use of call bell system

## 2021-04-12 ENCOUNTER — APPOINTMENT (INPATIENT)
Dept: NON INVASIVE DIAGNOSTICS | Facility: HOSPITAL | Age: 71
DRG: 871 | End: 2021-04-12
Payer: MEDICARE

## 2021-04-12 ENCOUNTER — APPOINTMENT (INPATIENT)
Dept: CT IMAGING | Facility: HOSPITAL | Age: 71
DRG: 871 | End: 2021-04-12
Payer: MEDICARE

## 2021-04-12 LAB
ANION GAP SERPL CALCULATED.3IONS-SCNC: 12 MMOL/L (ref 4–13)
ATRIAL RATE: 101 BPM
ATRIAL RATE: 102 BPM
ATRIAL RATE: 95 BPM
BUN SERPL-MCNC: 20 MG/DL (ref 7–25)
CALCIUM SERPL-MCNC: 8.5 MG/DL (ref 8.6–10.5)
CHLORIDE SERPL-SCNC: 97 MMOL/L (ref 98–107)
CO2 SERPL-SCNC: 26 MMOL/L (ref 21–31)
CREAT SERPL-MCNC: 0.75 MG/DL (ref 0.6–1.2)
ERYTHROCYTE [DISTWIDTH] IN BLOOD BY AUTOMATED COUNT: 13.6 % (ref 11.5–14.5)
GFR SERPL CREATININE-BSD FRML MDRD: 82 ML/MIN/1.73SQ M
GLUCOSE SERPL-MCNC: 145 MG/DL (ref 65–99)
GLUCOSE SERPL-MCNC: 185 MG/DL (ref 65–140)
GLUCOSE SERPL-MCNC: 226 MG/DL (ref 65–140)
GLUCOSE SERPL-MCNC: 255 MG/DL (ref 65–140)
GLUCOSE SERPL-MCNC: 301 MG/DL (ref 65–140)
HCT VFR BLD AUTO: 35.3 % (ref 42–47)
HGB BLD-MCNC: 11.5 G/DL (ref 12–16)
MAGNESIUM SERPL-MCNC: 2.3 MG/DL (ref 1.9–2.7)
MCH RBC QN AUTO: 28.7 PG (ref 26–34)
MCHC RBC AUTO-ENTMCNC: 32.6 G/DL (ref 31–37)
MCV RBC AUTO: 88 FL (ref 81–99)
P AXIS: 63 DEGREES
P AXIS: 68 DEGREES
P AXIS: 69 DEGREES
PLATELET # BLD AUTO: 290 THOUSANDS/UL (ref 149–390)
PMV BLD AUTO: 7.6 FL (ref 8.6–11.7)
POTASSIUM SERPL-SCNC: 3.9 MMOL/L (ref 3.5–5.5)
PR INTERVAL: 158 MS
PROCALCITONIN SERPL-MCNC: 6.91 NG/ML
QRS AXIS: -30 DEGREES
QRS AXIS: -35 DEGREES
QRS AXIS: -47 DEGREES
QRSD INTERVAL: 88 MS
QRSD INTERVAL: 88 MS
QRSD INTERVAL: 90 MS
QT INTERVAL: 320 MS
QT INTERVAL: 324 MS
QT INTERVAL: 328 MS
QTC INTERVAL: 412 MS
QTC INTERVAL: 417 MS
QTC INTERVAL: 420 MS
RBC # BLD AUTO: 4.02 MILLION/UL (ref 3.9–5.2)
SODIUM SERPL-SCNC: 135 MMOL/L (ref 134–143)
T WAVE AXIS: 52 DEGREES
T WAVE AXIS: 59 DEGREES
T WAVE AXIS: 67 DEGREES
VENTRICULAR RATE: 101 BPM
VENTRICULAR RATE: 102 BPM
VENTRICULAR RATE: 95 BPM
WBC # BLD AUTO: 29.5 THOUSAND/UL (ref 4.8–10.8)

## 2021-04-12 PROCEDURE — 84145 PROCALCITONIN (PCT): CPT | Performed by: PHYSICIAN ASSISTANT

## 2021-04-12 PROCEDURE — 97166 OT EVAL MOD COMPLEX 45 MIN: CPT

## 2021-04-12 PROCEDURE — 82948 REAGENT STRIP/BLOOD GLUCOSE: CPT

## 2021-04-12 PROCEDURE — 71260 CT THORAX DX C+: CPT

## 2021-04-12 PROCEDURE — 97163 PT EVAL HIGH COMPLEX 45 MIN: CPT

## 2021-04-12 PROCEDURE — 93306 TTE W/DOPPLER COMPLETE: CPT | Performed by: INTERNAL MEDICINE

## 2021-04-12 PROCEDURE — 80048 BASIC METABOLIC PNL TOTAL CA: CPT | Performed by: FAMILY MEDICINE

## 2021-04-12 PROCEDURE — 74177 CT ABD & PELVIS W/CONTRAST: CPT

## 2021-04-12 PROCEDURE — 93010 ELECTROCARDIOGRAM REPORT: CPT | Performed by: INTERNAL MEDICINE

## 2021-04-12 PROCEDURE — 94640 AIRWAY INHALATION TREATMENT: CPT

## 2021-04-12 PROCEDURE — 99233 SBSQ HOSP IP/OBS HIGH 50: CPT | Performed by: HOSPITALIST

## 2021-04-12 PROCEDURE — 83735 ASSAY OF MAGNESIUM: CPT | Performed by: FAMILY MEDICINE

## 2021-04-12 PROCEDURE — 94760 N-INVAS EAR/PLS OXIMETRY 1: CPT

## 2021-04-12 PROCEDURE — 93306 TTE W/DOPPLER COMPLETE: CPT

## 2021-04-12 PROCEDURE — G1004 CDSM NDSC: HCPCS

## 2021-04-12 RX ORDER — KETOROLAC TROMETHAMINE 30 MG/ML
15 INJECTION, SOLUTION INTRAMUSCULAR; INTRAVENOUS ONCE
Status: COMPLETED | OUTPATIENT
Start: 2021-04-12 | End: 2021-04-12

## 2021-04-12 RX ADMIN — VANCOMYCIN HYDROCHLORIDE 1250 MG: 1 INJECTION, POWDER, LYOPHILIZED, FOR SOLUTION INTRAVENOUS at 15:44

## 2021-04-12 RX ADMIN — GABAPENTIN 300 MG: 300 CAPSULE ORAL at 08:31

## 2021-04-12 RX ADMIN — METRONIDAZOLE 500 MG: 500 SOLUTION INTRAVENOUS at 18:10

## 2021-04-12 RX ADMIN — INSULIN DETEMIR 50 UNITS: 100 INJECTION, SOLUTION SUBCUTANEOUS at 21:19

## 2021-04-12 RX ADMIN — INSULIN LISPRO 1 UNITS: 100 INJECTION, SOLUTION INTRAVENOUS; SUBCUTANEOUS at 21:20

## 2021-04-12 RX ADMIN — CALCITONIN SALMON 1 SPRAY: 200 SPRAY, METERED NASAL at 08:30

## 2021-04-12 RX ADMIN — VANCOMYCIN HYDROCHLORIDE 1250 MG: 1 INJECTION, POWDER, LYOPHILIZED, FOR SOLUTION INTRAVENOUS at 03:41

## 2021-04-12 RX ADMIN — LEVALBUTEROL HYDROCHLORIDE 1.25 MG: 1.25 SOLUTION, CONCENTRATE RESPIRATORY (INHALATION) at 08:32

## 2021-04-12 RX ADMIN — LEVALBUTEROL HYDROCHLORIDE 1.25 MG: 1.25 SOLUTION, CONCENTRATE RESPIRATORY (INHALATION) at 14:16

## 2021-04-12 RX ADMIN — GABAPENTIN 300 MG: 300 CAPSULE ORAL at 15:44

## 2021-04-12 RX ADMIN — CEFEPIME HYDROCHLORIDE 2000 MG: 2 INJECTION, SOLUTION INTRAVENOUS at 01:30

## 2021-04-12 RX ADMIN — IPRATROPIUM BROMIDE 0.5 MG: 0.5 SOLUTION RESPIRATORY (INHALATION) at 14:16

## 2021-04-12 RX ADMIN — INSULIN LISPRO 4 UNITS: 100 INJECTION, SOLUTION INTRAVENOUS; SUBCUTANEOUS at 11:11

## 2021-04-12 RX ADMIN — PREDNISONE 2.5 MG: 1 TABLET ORAL at 08:31

## 2021-04-12 RX ADMIN — IOHEXOL 100 ML: 350 INJECTION, SOLUTION INTRAVENOUS at 09:43

## 2021-04-12 RX ADMIN — CEFEPIME HYDROCHLORIDE 2000 MG: 2 INJECTION, SOLUTION INTRAVENOUS at 13:56

## 2021-04-12 RX ADMIN — IPRATROPIUM BROMIDE 0.5 MG: 0.5 SOLUTION RESPIRATORY (INHALATION) at 08:32

## 2021-04-12 RX ADMIN — KETOROLAC TROMETHAMINE 15 MG: 30 INJECTION, SOLUTION INTRAMUSCULAR at 04:45

## 2021-04-12 RX ADMIN — METRONIDAZOLE 500 MG: 500 SOLUTION INTRAVENOUS at 10:22

## 2021-04-12 RX ADMIN — INSULIN LISPRO 3 UNITS: 100 INJECTION, SOLUTION INTRAVENOUS; SUBCUTANEOUS at 16:56

## 2021-04-12 RX ADMIN — GABAPENTIN 300 MG: 300 CAPSULE ORAL at 21:22

## 2021-04-12 RX ADMIN — ATORVASTATIN CALCIUM 40 MG: 40 TABLET, FILM COATED ORAL at 18:10

## 2021-04-12 RX ADMIN — INSULIN LISPRO 2 UNITS: 100 INJECTION, SOLUTION INTRAVENOUS; SUBCUTANEOUS at 08:30

## 2021-04-12 RX ADMIN — ASPIRIN 81 MG: 81 TABLET, COATED ORAL at 08:31

## 2021-04-12 RX ADMIN — ENOXAPARIN SODIUM 40 MG: 40 INJECTION SUBCUTANEOUS at 08:30

## 2021-04-12 NOTE — PHYSICAL THERAPY NOTE
Physical Therapy Evaluation     Patient's Name: Aris Schumacher    Admitting Diagnosis  Altered mental status [R41 82]  Pneumonia [J18 9]  Encephalopathy in sepsis [G93 41]  Severe sepsis ([facility] ) [A41 9, R65 20]    Problem List  Patient Active Problem List   Diagnosis    Class 3 severe obesity in adult [facility])    Type 2 diabetes mellitus ([facility] )    Lupus ([facility] )    Sepsis ([facility] )    Electrolyte abnormality    Acute metabolic encephalopathy    Elevated troponin       Past Medical History  Past Medical History:   Diagnosis Date    Acquired curvature of spine     Arthritis     osterarthritis    Diabetes mellitus ([facility] )     Lupus ([facility] )        Past Surgical History  Past Surgical History:   Procedure Laterality Date    CARDIAC SURGERY      cardiac stent 6 yrs ago    HYSTERECTOMY      30 yrs ago          04/12/21 0929   PT Last Visit   PT Visit Date 04/12/21   Note Type   Note type Evaluation   Pain Assessment   Pain Assessment Tool Pain Assessment not indicated - pt denies pain   Pain Score No Pain   Home Living   Type of [address] [place] [address] Two level;Bed/bath upstairs;Stairs to enter with rails;1/2 bath on main level  (FF to 2nd floor, 2 vs 4 VON)   Bathroom Shower/Tub Walk-in shower   Bathroom Toilet Standard   Bathroom Equipment Shower chair   [address] Cane;Walker;Quad cane  (pt using QC at baseline)   Prior Function   Level of [place] Independent with ADLs and functional mobility   Lives With Daughter;Family   Receives Help From Family   ADL Assistance Independent   IADLs Independent   Falls in the last 6 months 0   Vocational Part time employment  (works 2 days/wk - ; retiring at end of year)   Comments pt drives   Restrictions/Precautions   [place] Bearing Precautions Per Order No   Other Precautions Fall Risk;Telemetry;Multiple lines   General   Family/Caregiver Present No   Cognition   Overall Cognitive Status WFL   Arousal/Participation Alert Orientation Level Oriented X4   Memory Within functional limits   Following Commands Follows all commands and directions without difficulty   Comments pt agreeable to PT session   RLE Assessment   RLE Assessment WFL  (grossly 4-/5 throughout)   LLE Assessment   LLE Assessment WFL  (grossly 4-/5 throughout)   Coordination   Movements are Fluid and Coordinated 1   Sensation WFL   Light Touch   RLE Light Touch Grossly intact   LLE Light Touch Grossly intact   Bed Mobility   Supine to Sit Unable to assess  (pt received OOB in recliner)   Transfers   Sit to Stand 5  Supervision   Additional items Assist x 1; Armrests; Increased time required   Stand to Sit 5  Supervision   Additional items Assist x 1; Armrests; Verbal cues   Additional Comments pt denies any lightheadedness/dizziness   Ambulation/Elevation   Gait pattern Improper Weight shift; Foward flexed; Short stride; Excessively slow   Gait Assistance 5  Supervision   Additional items Assist x 1;Verbal cues; Tactile cues   Assistive Device Rolling walker   Distance 25 ft   Stair Management Assistance Not tested   Balance   Static Sitting Good   Dynamic Sitting Fair +   Static Standing Fair   Dynamic Standing Fair -   Ambulatory Fair -   Endurance Deficit   Endurance Deficit Yes   Activity Tolerance   Activity Tolerance Patient limited by fatigue   Nurse Made Aware Yes, RN Pacheco Elder verbalized pt appropriate for PT session, made aware of outcomes/recs   Assessment   Prognosis Good   Problem List Decreased strength; Impaired balance;Decreased mobility; Decreased endurance   Assessment Pt is 72 y o  female seen for PT evaluation on 4/12/2021 s/p admit to [address] on 4/11/2021 w/ Sepsis ([facility] )  PT consulted to assess pt's functional mobility and d/c needs  Order placed for PT eval and tx, w/ up w/ A order  PT performed at least 2 patient identifiers during session: Name and wristband   Comorbidities affecting pt's physical performance at time of assessment include: sepsis, acute metabolic encephalothy, elevated troponin, obesity, electrolyte abnormality, lupus, DM type 2  PTA, pt was independent w/ all functional mobility w/ QC at baseline, ambulates community distances and elevations, ambulates household distances, has 2 vs 4 VON, lives w/ dtr and dtr's family in 2 level home and works part time  Personal factors affecting pt at time of IE include: ambulating w/ assistive device, stairs to enter home, unable to perform dynamic tasks in community and unable to perform physical activity  Please find objective findings from PT assessment regarding body systems outlined above with impairments and limitations including weakness, impaired balance, decreased endurance, decreased activity tolerance and fall risk, as well as mobility assessment (need for cueing for mobility technique)  The following objective measures performed on IE also reveal limitations: Barthel Index: 23/387 and AM-PAC 6-Clicks: 05/28  Pt's clinical presentation is currently unstable/unpredictable seen in pt's presentation of abnormal lab value(s), on telemetry monitoring and ongoing medical assessment  Pt to benefit from continued PT tx to address deficits as defined above and maximize level of functional independent mobility and consistency  From PT/mobility standpoint, recommendation at time of d/c would be OP PT pending progress in order to facilitate return to PLOF     Barriers to Discharge None   Goals   Patient Goals "to return home"   STG Expiration Date 04/22/21   Short Term Goal #1 In 7-10 days: Increase bilateral LE strength 1/2 grade to facilitate independent mobility, Perform all bed mobility tasks modified independent to decrease caregiver burden, Perform all transfers modified independent to improve independence, Ambulate > 150 ft  with least restrictive assistive device modified independent w/o LOB and w/ normalized gait pattern 100% of the time, Navigate 10 stairs modified independent with unilateral handrail to facilitate return to previous living environment, Increase all balance 1/2 grade to decrease risk for falls, Complete exercise program independently and Tolerate 4 hr OOB to faciliate upright tolerance   PT Treatment Day 0   Plan   Treatment/Interventions Functional transfer training;LE strengthening/ROM; Elevations; Therapeutic exercise; Endurance training;Patient/family training;Equipment eval/education;Gait training;Spoke to nursing;Spoke to case management   PT Frequency   (3-5x/wk)   Recommendation   PT Discharge Recommendation Home with skilled therapy  (OP PT)   Equipment Recommended   (continue RW use at this time)   PT - OK to Discharge No   Additional Comments Pt's raw score on the AM-[facility] Basic Mobility inpatient short form is 20, standardized score is 43 99  Patients at this level are likely to benefit from DC to home, however, please refer to therapist recommendation for safe DC planning     AM-PAC Basic Mobility Inpatient   Turning in Bed Without Bedrails 4   Lying on Back to Sitting on Edge of Flat Bed 4   Moving Bed to Chair 4   Standing Up From Chair 3   Walk in Room 3   Climb 3-5 Stairs 2   Basic Mobility Inpatient Raw Score 20   Basic Mobility Standardized Score 43 99   Barthel Index   Feeding 10   Bathing 0   Grooming Score 5   Dressing Score 5   Bladder Score 10   Bowels Score 10   Toilet Use Score 10   Transfers (Bed/Chair) Score 10   Mobility (Level Surface) Score 0   Stairs Score 0   Barthel Index Score 60         Aime Anaya, PT

## 2021-04-12 NOTE — NURSING NOTE
Patient awakened for assessment  Sats remain marginal on 6 liters of flow  Finger probe changed and sat was noted to  Be 100% Patient's flow of oxygen put back to 4 liters and sat currently 97%  Lungs CTA

## 2021-04-12 NOTE — PROGRESS NOTES
Vancomycin IV Pharmacy-to-Dose Consultation    Cecy Pascal is a 72 y o  female who is currently receiving Vancomycin IV with management by the Pharmacy Consult service  Assessment/Plan:  The patient was reviewed  Renal function is stable and no signs or symptoms of nephrotoxicity and/or infusion reactions were documented in the chart  Based on todays assessment, continue current vancomycin (day # 2) dosing of 1250mg iv q12h, with a plan for trough to be drawn at 0300 on 4/13/2021  We will continue to follow the patients culture results and clinical progress daily      Eliana Richmond, Pharmacist

## 2021-04-12 NOTE — PLAN OF CARE
Problem: Prexisting or High Potential for Compromised Skin Integrity  Goal: Skin integrity is maintained or improved  Description: INTERVENTIONS:  - Identify patients at risk for skin breakdown  - Assess and monitor skin integrity  - Assess and monitor nutrition and hydration status  - Monitor labs   - Assess for incontinence   - Turn and reposition patient  - Assist with mobility/ambulation  - Relieve pressure over bony prominences  - Avoid friction and shearing  - Provide appropriate hygiene as needed including keeping skin clean and dry  - Evaluate need for skin moisturizer/barrier cream  - Collaborate with interdisciplinary team   - Patient/family teaching  - Consider wound care consult   Outcome: Progressing     Problem: Potential for Falls  Goal: Patient will remain free of falls  Description: INTERVENTIONS:  - Assess patient frequently for physical needs  -  Identify cognitive and physical deficits and behaviors that affect risk of falls    -  [place] fall precautions as indicated by assessment   - Educate patient/family on patient safety including physical limitations  - Instruct patient to call for assistance with activity based on assessment  - Modify environment to reduce risk of injury  - Consider OT/PT consult to assist with strengthening/mobility  Outcome: Progressing     Problem: NEUROSENSORY - ADULT  Goal: Achieves stable or improved neurological status  Description: INTERVENTIONS  - Monitor and report changes in neurological status  - Monitor vital signs such as temperature, blood pressure, glucose, and any other labs ordered   - Initiate measures to prevent increased intracranial pressure  - Monitor for seizure activity and implement precautions if appropriate      Outcome: Progressing  Goal: Remains free of injury related to seizures activity  Description: INTERVENTIONS  - Maintain airway, patient safety  and administer oxygen as ordered  - Monitor patient for seizure activity, document and report duration and description of seizure to physician/advanced practitioner  - If seizure occurs,  ensure patient safety during seizure  - Reorient patient post seizure  - Seizure pads on all 4 side rails  - Instruct patient/family to notify RN of any seizure activity including if an aura is experienced  - Instruct patient/family to call for assistance with activity based on nursing assessment  - Administer anti-seizure medications if ordered    Outcome: Progressing  Goal: Achieves maximal functionality and self care  Description: INTERVENTIONS  - Monitor swallowing and airway patency with patient fatigue and changes in neurological status  - Encourage and assist patient to increase activity and self care     - Encourage visually impaired, hearing impaired and aphasic patients to use assistive/communication devices  Outcome: Progressing     Problem: CARDIOVASCULAR - ADULT  Goal: Maintains optimal cardiac output and hemodynamic stability  Description: INTERVENTIONS:  - Monitor I/O, vital signs and rhythm  - Monitor for S/S and trends of decreased cardiac output  - Administer and titrate ordered vasoactive medications to optimize hemodynamic stability  - Assess quality of pulses, skin color and temperature  - Assess for signs of decreased coronary artery perfusion  - Instruct patient to report change in severity of symptoms  Outcome: Progressing  Goal: Absence of cardiac dysrhythmias or at baseline rhythm  Description: INTERVENTIONS:  - Continuous cardiac monitoring, vital signs, obtain 12 lead EKG if ordered  - Administer antiarrhythmic and heart rate control medications as ordered  - Monitor electrolytes and administer replacement therapy as ordered  Outcome: Progressing     Problem: RESPIRATORY - ADULT  Goal: Achieves optimal ventilation and oxygenation  Description: INTERVENTIONS:  - Assess for changes in respiratory status  - Assess for changes in mentation and behavior  - Position to facilitate oxygenation and minimize respiratory effort  - Oxygen administered by appropriate delivery if ordered  - Initiate smoking cessation education as indicated  - Encourage broncho-pulmonary hygiene including cough, deep breathe, Incentive Spirometry  - Assess the need for suctioning and aspirate as needed  - Assess and instruct to report SOB or any respiratory difficulty  - Respiratory Therapy support as indicated  Outcome: Progressing     Problem: METABOLIC, FLUID AND ELECTROLYTES - ADULT  Goal: Electrolytes maintained within normal limits  Description: INTERVENTIONS:  - Monitor labs and assess patient for signs and symptoms of electrolyte imbalances  - Administer electrolyte replacement as ordered  - Monitor response to electrolyte replacements, including repeat lab results as appropriate  - Instruct patient on fluid and nutrition as appropriate  Outcome: Progressing  Goal: Fluid balance maintained  Description: INTERVENTIONS:  - Monitor labs   - Monitor I/O and WT  - Instruct patient on fluid and nutrition as appropriate  - Assess for signs & symptoms of volume excess or deficit  Outcome: Progressing  Goal: Glucose maintained within target range  Description: INTERVENTIONS:  - Monitor Blood Glucose as ordered  - Assess for signs and symptoms of hyperglycemia and hypoglycemia  - Administer ordered medications to maintain glucose within target range  - Assess nutritional intake and initiate nutrition service referral as needed  Outcome: Progressing     Problem: SKIN/TISSUE INTEGRITY - ADULT  Goal: Skin integrity remains intact  Description: INTERVENTIONS  - Identify patients at risk for skin breakdown  - Assess and monitor skin integrity  - Assess and monitor nutrition and hydration status  - Monitor labs (i e  albumin)  - Assess for incontinence   - Turn and reposition patient  - Assist with mobility/ambulation  - Relieve pressure over bony prominences  - Avoid friction and shearing  - Provide appropriate hygiene as needed including keeping skin clean and dry  - Evaluate need for skin moisturizer/barrier cream  - Collaborate with interdisciplinary team (i e  Nutrition, Rehabilitation, etc )   - Patient/family teaching  Outcome: Progressing  Goal: Incision(s), wounds(s) or drain site(s) healing without S/S of infection  Description: INTERVENTIONS  - Assess and document risk factors for skin impairment   - Assess and document dressing, incision, wound bed, drain sites and surrounding tissue  - Consider nutrition services referral as needed  - Oral mucous membranes remain intact  - Provide patient/ family education  Outcome: Progressing  Goal: Oral mucous membranes remain intact  Description: INTERVENTIONS  - Assess oral mucosa and hygiene practices  - Implement preventative oral hygiene regimen  - Implement oral medicated treatments as ordered  - Initiate Nutrition services referral as needed  Outcome: Progressing     Problem: PAIN - ADULT  Goal: Verbalizes/displays adequate comfort level or baseline comfort level  Description: Interventions:  - Encourage patient to monitor pain and request assistance  - Assess pain using appropriate pain scale  - Administer analgesics based on type and severity of pain and evaluate response  - Implement non-pharmacological measures as appropriate and evaluate response  - Consider cultural and social influences on pain and pain management  - Notify physician/advanced practitioner if interventions unsuccessful or patient reports new pain  Outcome: Progressing     Problem: INFECTION - ADULT  Goal: Absence or prevention of progression during hospitalization  Description: INTERVENTIONS:  - Assess and monitor for signs and symptoms of infection  - Monitor lab/diagnostic results  - Monitor all insertion sites, i e  indwelling lines, tubes, and drains  - Monitor endotracheal if appropriate and nasal secretions for changes in amount and color  - [place] appropriate cooling/warming therapies per order  - Administer medications as ordered  - Instruct and encourage patient and family to use good hand hygiene technique  - Identify and instruct in appropriate isolation precautions for identified infection/condition  Outcome: Progressing  Goal: Absence of fever/infection during neutropenic period  Description: INTERVENTIONS:  - Monitor WBC    Outcome: Progressing     Problem: SAFETY ADULT  Goal: Patient will remain free of falls  Description: INTERVENTIONS:  - Assess patient frequently for physical needs  -  Identify cognitive and physical deficits and behaviors that affect risk of falls    -  [place] fall precautions as indicated by assessment   - Educate patient/family on patient safety including physical limitations  - Instruct patient to call for assistance with activity based on assessment  - Modify environment to reduce risk of injury  - Consider OT/PT consult to assist with strengthening/mobility  Outcome: Progressing  Goal: Maintain or return to baseline ADL function  Description: INTERVENTIONS:  -  Assess patient's ability to carry out ADLs; assess patient's baseline for ADL function and identify physical deficits which impact ability to perform ADLs (bathing, care of mouth/teeth, toileting, grooming, dressing, etc )  - Assess/evaluate cause of self-care deficits   - Assess range of motion  - Assess patient's mobility; develop plan if impaired  - Assess patient's need for assistive devices and provide as appropriate  - Encourage maximum independence but intervene and supervise when necessary  - Involve family in performance of ADLs  - Assess for home care needs following discharge   - Consider OT consult to assist with ADL evaluation and planning for discharge  - Provide patient education as appropriate  Outcome: Progressing  Goal: Maintain or return mobility status to optimal level  Description: INTERVENTIONS:  - Assess patient's baseline mobility status (ambulation, transfers, stairs, etc )    - Identify cognitive and physical deficits and behaviors that affect mobility  - Identify mobility aids required to assist with transfers and/or ambulation (gait belt, sit-to-stand, lift, walker, cane, etc )  - [place] fall precautions as indicated by assessment  - Record patient progress and toleration of activity level on Mobility SBAR; progress patient to next Phase/Stage  - Instruct patient to call for assistance with activity based on assessment  - Consider rehabilitation consult to assist with strengthening/weightbearing, etc   Outcome: Progressing     Problem: DISCHARGE PLANNING  Goal: Discharge to home or other facility with appropriate resources  Description: INTERVENTIONS:  - Identify barriers to discharge w/patient and caregiver  - Arrange for needed discharge resources and transportation as appropriate  - Identify discharge learning needs (meds, wound care, etc )  - Arrange for interpretive services to assist at discharge as needed  - Refer to Case Management Department for coordinating discharge planning if the patient needs post-hospital services based on physician/advanced practitioner order or complex needs related to functional status, cognitive ability, or social support system  Outcome: Progressing     Problem: Knowledge Deficit  Goal: Patient/family/caregiver demonstrates understanding of disease process, treatment plan, medications, and discharge instructions  Description: Complete learning assessment and assess knowledge base    Interventions:  - Provide teaching at level of understanding  - Provide teaching via preferred learning methods  Outcome: Progressing

## 2021-04-12 NOTE — ASSESSMENT & PLAN NOTE
Lab Results   Component Value Date    HGBA1C 8 9 (H) 01/08/2021       Recent Labs     04/11/21  1300 04/11/21  1550 04/11/21  2044 04/12/21  0717   POCGLU 119 109 126 226*       Blood Sugar Average: Last 72 hrs:  (P) 145   · Oral hypoglycemics remain on hold  · Continue Levemir, Accu-Cheks AC and HS with sliding scale insulin coverage

## 2021-04-12 NOTE — PLAN OF CARE
Problem: OCCUPATIONAL THERAPY ADULT  Goal: Performs self-care activities at highest level of function for planned discharge setting  See evaluation for individualized goals  Description: Treatment Interventions: ADL retraining, Functional transfer training, UE strengthening/ROM, Endurance training, Patient/family training          See flowsheet documentation for full assessment, interventions and recommendations  Note: Limitation: Decreased ADL status, Decreased UE strength, Decreased endurance, Decreased self-care trans, Decreased high-level ADLs  Prognosis: Good  Assessment: Patient is a 72 y o  female seen for OT evaluation at [address] [place] [address] on 4/11/2021  s/p Sepsis ([address] )  Comorbidities impacting functional performance include: obesity, DM2, Lupus, electrolyte abnormality, acute metabolic encephalopathy, and elevated troponin levels  Patient presents with active orders for OT eval and treat and Up with assistance   Performed at least 2 patient identifiers during session including name and wristband  Patient reports prior level of function as independent  with ADL/IADLs, ambulatory with quad cane, and lives with daughter and family in a 2 story house  with 2 vs 4  VON  Patient works part time  and does  drive  Upon initial evaluation, patient requires supervision for UB ADLs, min assist for LB ADLs, and supervision for transfers and functional mobility with  Based on functional eval, patient presents A&Ox4 with intact  attention, safety awareness, and memory  Occupational performance is affected by the following deficits: endurance , muscular strength , balance  and trunk control   Patient would benefit from OT services to maximize independence in self-care and transfers  Personal factors impacting performance include: VON home  and FOS to second floor   Occupational areas to address include:bathing/showering, toileting, dressing , personal hygiene/grooming , bed mobility , functional mobility, meal preparation and home management   Recommending return to previous environment with social support and OP OT upon D/C  OT Discharge Recommendation: Other (Comment)(OP OT   Return to previous environment c social )  OT - OK to Discharge: Yes(Once medically cleared)     Jasmina Dave OTS

## 2021-04-12 NOTE — PLAN OF CARE
Problem: PHYSICAL THERAPY ADULT  Goal: Performs mobility at highest level of function for planned discharge setting  See evaluation for individualized goals  Description: Treatment/Interventions: Functional transfer training, LE strengthening/ROM, Elevations, Therapeutic exercise, Endurance training, Patient/family training, Equipment eval/education, Gait training, Spoke to nursing, Spoke to case management  Equipment Recommended: (continue RW use at this time)       See flowsheet documentation for full assessment, interventions and recommendations  Note: Prognosis: Good  Problem List: Decreased strength, Impaired balance, Decreased mobility, Decreased endurance  Assessment: Pt is 72 y o  female seen for PT evaluation on 4/12/2021 s/p admit to [address] on 4/11/2021 w/ Sepsis ([facility] [address] )  PT consulted to assess pt's functional mobility and d/c needs  Order placed for PT eval and tx, w/ up w/ A order  PT performed at least 2 patient identifiers during session: Name and wristband  Comorbidities affecting pt's physical performance at time of assessment include: sepsis, acute metabolic encephalothy, elevated troponin, obesity, electrolyte abnormality, lupus, DM type 2  PTA, pt was independent w/ all functional mobility w/ QC at baseline, ambulates community distances and elevations, ambulates household distances, has 2 vs 4 VON, lives w/ dtr and dtr's family in 2 level home and works part time  Personal factors affecting pt at time of IE include: ambulating w/ assistive device, stairs to enter home, unable to perform dynamic tasks in community and unable to perform physical activity  Please find objective findings from PT assessment regarding body systems outlined above with impairments and limitations including weakness, impaired balance, decreased endurance, decreased activity tolerance and fall risk, as well as mobility assessment (need for cueing for mobility technique)   The following objective measures performed on IE also reveal limitations: Barthel Index: 06/816 and AM-PAC 6-Clicks: 23/88  Pt's clinical presentation is currently unstable/unpredictable seen in pt's presentation of abnormal lab value(s), on telemetry monitoring and ongoing medical assessment  Pt to benefit from continued PT tx to address deficits as defined above and maximize level of functional independent mobility and consistency  From PT/mobility standpoint, recommendation at time of d/c would be OP PT pending progress in order to facilitate return to PLOF  Barriers to Discharge: None     PT Discharge Recommendation: (S) Home with skilled therapy(OP PT)     PT - OK to Discharge: No    See flowsheet documentation for full assessment

## 2021-04-12 NOTE — NURSING NOTE
Patient awake with complaints of right sub-scapular pain which she is rating a 4/10 on the pain scale  Pain associated with deep inspiration and sharp in nature  Ananya CRUZ made aware of same  SPO2 has been adeaquate enough to allow for titration down on flow from 4 liters to 2 liters  Patient denies any SOB or KEITA

## 2021-04-12 NOTE — NURSING NOTE
Informed by PCA that she needed to awaken patient and ask her to take deep breaths as her sPO2 was low  Patient noted to be 85-86 and minimal improvement after awakening  Flow of oxygen increased from 4 to 6 liters with improvement of sPO2 to 89%; will monitor  Ananya CRUZ made aware of same

## 2021-04-12 NOTE — OCCUPATIONAL THERAPY NOTE
Occupational Therapy Evaluation      Cecy Deal    4/12/2021    Patient Active Problem List   Diagnosis    Class 3 severe obesity in adult [facility])    Type [facility]    Lupus ([facility]    Sepsis ([facility]    Electrolyte abnormality    Acute metabolic encephalopathy    Elevated troponin       Past Medical History:   Diagnosis Date    Acquired curvature of spine     Arthritis     osterarthritis    Diabetes mellitus ([facility]     Lupus ([facility] )        Past Surgical History:   Procedure Laterality Date    CARDIAC SURGERY      cardiac stent 6 yrs ago    HYSTERECTOMY      30 yrs ago        04/12/21 0925   OT Last Visit   OT Visit Date 04/12/21   Note Type   Note type Evaluation   Restrictions/Precautions   Weight Bearing Precautions Per Order No   Other Precautions Telemetry; Fall Risk;Multiple lines   Pain Assessment   Pain Assessment Tool Pain Assessment not indicated - pt denies pain   Pain Score No Pain   Home Living   Type of Home House   Home Layout Two level;Bed/bath upstairs;Stairs to enter with rails;1/2 bath on main level  (FOS to 2nd floor  2 vs 4 VON )   Bathroom Shower/Tub Walk-in shower   Bathroom Toilet Standard   Bathroom Equipment Shower chair   [address] Cane;Walker;Quad cane  (Pt uses quad cane at baseline )   Prior Function   Level of [place] Independent with ADLs and functional mobility   Lives With Daughter;Family   Receives Help From Family   ADL Assistance Independent   IADLs Independent   Falls in the last 6 months 0   Vocational Part time employment  (works 2x week as   Plans to retire at end of year )   Comments (+) driving    Lifestyle   Autonomy Pt lives c daughter and family in 2 story house with 2 vs 4 VON and FOS to 2nd floor c HR  Pt reports PLOF as (I) c ADL/IADLs, works part time, and does drive   Pt uses quad cane at baseline    Reciprocal Relationships daughter, family    Service to Others part time     ADL Eating Assistance 5  Supervision/Setup   Grooming Assistance 5  Supervision/Setup   UB Bathing Assistance 5  Supervision/Setup   LB Bathing Assistance 4  Minimal Assistance   UB Dressing Assistance 5  Supervision/Setup   LB Dressing Assistance 4  Minimal Assistance   Toileting Assistance  4  Minimal Assistance   Additional Comments Pt limited by decreased endurance and strength    Bed Mobility   Supine to Sit   (DNT: pt seated OOB in recliner upon arrival )   Sit to Supine   (DNT: pt seated in w/c at end of session for CT imaging )   Transfers   Sit to Stand 5  Supervision   Additional items Assist x 1; Armrests; Increased time required  (c RW)   Stand to Sit 5  Supervision   Additional items Assist x 1; Armrests; Increased time required;Verbal cues  (c RW)   Additional Comments Denies lightheaded/dizziness c positional changes  Use of safe transfer techniques demonstrated    Functional Mobility   Functional Mobility 5  Supervision   Additional Comments Pt ambulated short distance within hallway with no LOB or gross instability  No s/s of exertion    Additional items Rolling walker   Balance   Static Sitting Good   Dynamic Sitting Fair +   Static Standing Fair   Dynamic Standing Fair -   Activity Tolerance   Activity Tolerance Patient limited by fatigue   Medical Staff Made Aware PT Mary Jo present during session    Nurse Made Aware Pt appropriate to see per XIOMARA Chairez; made aware of outcomes / recs    RUE Assessment   RUE Assessment WFL  (Full AROM, 4-/5 MMT )   LUE Assessment   LUE Assessment WFL  (Full AROM, 4-/5 MMT )   Hand Function   Gross Motor Coordination Functional   Fine Motor Coordination Functional   Sensation   Light Touch No apparent deficits   Vision-Basic Assessment   Current Vision Wears glasses only for reading   Cognition   Overall Cognitive Status [facility]   Arousal/Participation Alert; Responsive; Cooperative   Attention Within functional limits   Orientation Level Oriented X4   Memory Within functional limits   Following Commands Follows all commands and directions without difficulty   Comments Pt agreeable to OT session, pleasant and engaged    Assessment   Limitation Decreased ADL status; Decreased UE strength;Decreased endurance;Decreased self-care trans;Decreased high-level ADLs   Prognosis Good   Assessment Patient is a 72 y o  female seen for OT evaluation at [address] [place] [address] on 4/11/2021  s/p Sepsis ([address] )  Comorbidities impacting functional performance include: obesity, DM2, Lupus, electrolyte abnormality, acute metabolic encephalopathy, and elevated troponin levels  Patient presents with active orders for OT eval and treat and Up with assistance   Performed at least 2 patient identifiers during session including name and wristband  Patient reports prior level of function as independent  with ADL/IADLs, ambulatory with quad cane, and lives with daughter and family in a 2 story house  with 2 vs 4  VON  Patient works part time  and does  drive  Upon initial evaluation, patient requires supervision for UB ADLs, min assist for LB ADLs, and supervision for transfers and functional mobility with  Based on functional eval, patient presents A&Ox4 with intact  attention, safety awareness, and memory  Occupational performance is affected by the following deficits: endurance , muscular strength , balance  and trunk control   Patient would benefit from OT services to maximize independence in self-care and transfers  Personal factors impacting performance include: VON home  and FOS to second floor  Occupational areas to address include:bathing/showering, toileting, dressing , personal hygiene/grooming , bed mobility , functional mobility, meal preparation and home management   Recommending return to previous environment with social support and OP OT upon D/C  Goals   Patient Goals to get stronger    Plan   Treatment Interventions ADL retraining;Functional transfer training;UE strengthening/ROM; Endurance training;Patient/family training   Goal Expiration Date 04/22/21   OT Treatment Day 0   OT Frequency 3-5x/wk   Recommendation   OT Discharge Recommendation Other (Comment)  (OP OT  Return to previous environment c social )   OT - OK to Discharge Yes  (Once medically cleared)   Additional Comments  Pt seated in W/C with PCA Yvon present for transport to CT    Additional Comments 2 The patient's raw score on the AM-PAC Daily Activity inpatient short form is 19, standardized score is 40 22, greater than 39 4  Patients at this level are likely to benefit from discharge to home  Please refer to the recommendation of the Occupational Therapist for safe discharge planning  AM-PAC Daily Activity Inpatient   Lower Body Dressing 3   Bathing 3   Toileting 3   Upper Body Dressing 3   Grooming 3   Eating 4   Daily Activity Raw Score 19   Daily Activity Standardized Score (Calc for Raw Score >=11) 40 22   AM-[facility] Applied Cognition Inpatient   Following a Speech/Presentation 4   Understanding Ordinary Conversation 4   Taking Medications 4   Remembering Where Things Are Placed or Put Away 4   Remembering List of 4-5 Errands 4   Taking Care of Complicated Tasks 4   Applied Cognition Raw Score 24   Applied Cognition Standardized Score 62 21   Barthel Index   Feeding 10   Bathing 5   Grooming Score 5   Dressing Score 5   Bladder Score 10   Bowels Score 10   Toilet Use Score 5   Transfers (Bed/Chair) Score 10   Mobility (Level Surface) Score 0   Stairs Score 0  (DNT)   Barthel Index Score 60     Pt will complete UB ADLs Mod independent  with use of AE as needed for increased ADL independence within 10 days  Pt will complete LB ADLs Mod independent   with use of AE as needed for increased ADL independence within 10 days  Pt will complete toileting Mod independent   with use of DME for increased ADL independence within 10 days       Pt will demonstrate proper body mechanics to complete transfers and functional mobility with Mod independent  and use of AD for increased safety and functional mobility within 10 days  Pt will demonstrate standing tolerance of 5 min with Mod independent  and use of AD for increased endurance and activity tolerance within 10 days  Pt will demonstrate OOB sitting tolerance of 2-4 hr/day for increased activity tolerance and engagement within 10 days  Pt will participate in 10 min of BUE therapeutic exercise to increase strength, ROM, and endurance during ADL/IADLs within 10 days         Pt will demonstrate proper body mechanics and fall prevention strategies during 100% of tx sessions for increased safety awareness during ADL/IADLs    Thedore Null, OTS

## 2021-04-12 NOTE — PROGRESS NOTES
[facility]  Progress Note - Jose L Sheth 1950, 72 y o  female MRN: [id number]  Unit/Bed#: ICU 01 Encounter: [id number]  Primary Care Provider: Suzi Mcardle, MD   Date and time admitted to hospital: 4/11/2021 12:36 PM    * Sepsis ([facility] )  Assessment & Plan  · Sepsis parameters have significantly improved but not completely resolved  · Patient is no longer tachypneic, and or tachycardic  · Lactic acidosis has resolved, however white blood cell count is up to 29 5  · Unspecified exact etiology  · Procalcitonin testing is 0 19, will continue to trend  · Continue vancomycin, and cefepime day 2, will add metronidazole since the patient reports intermittent episodes of diarrhea which she attributes to metformin use  · Will check stool for C diff, fecal leukocytes, as well as a stool culture  · Additionally, at this time will check a CT scan of the chest, abdomen and pelvis to rule out any other potential source of sepsis  · Continue present management here in the step-down unit    Acute metabolic encephalopathy  Assessment & Plan  · Currently resolved - most likely secondary to the sepsis which has improved with IV antibiotics  · CT scan of the head was within normal limits  · Patient is AAO x3  · Will monitor closely    Elevated troponin  Assessment & Plan  · Troponin trend as follows:  0 04, 0 05, 0 04  · No true rise and or fall  · Suspect that this is a non MI related elevated troponinemia secondary to sepsis  · EKGs grossly within normal limits, patient is asymptomatic, and denies ever having had any recent chest pain    Class 3 severe obesity in adult [facility])  Assessment & Plan  · BMI of 37 82  · Dietary, weight loss, and lifestyle modification counseling provided    Electrolyte abnormality  Assessment & Plan  · Hypokalemia-resolved with repletion  · Hypomagnesemia-resolved with repletion    Lupus ([facility] )  Assessment & Plan  · Without a flare  · Continue low-dose prednisone    Type 2 diabetes mellitus [facility])  Assessment & Plan  Lab Results   Component Value Date    HGBA1C 8 9 (H) 2021       Recent Labs     21  1300 21  1550 21  2044 21  0717   POCGLU 119 109 126 226*       Blood Sugar Average: Last 72 hrs:  (P) 145   · Oral hypoglycemics remain on hold  · Continue Levemir, Accu-Cheks AC and HS with sliding scale insulin coverage  · Diabetic neuropathy-continue gabapentin        VTE Prophylaxis:  Enoxaparin (Lovenox)    Patient Centered Rounds: I have performed bedside rounds with nursing staff today  Discussions with Specialists or Other Care Team Provider:  Case management, nursing, pharmacy  Education and Discussions with Family / Patient:  Patient was brought up to par with the plan of care for today, all questions answered to her satisfaction, upon direct questioning she did not want me to call anybody to update them    Current Length of Stay: 1 day(s)    Current Patient Status: Inpatient   Certification Statement: The patient will continue to require additional inpatient hospital stay due to The need for continued IV antibiotics as well as the need for a CT scan of the chest, abdomen and pelvis    Discharge Plan:  Discharge planning will commence once the patient has been deemed medically stable    Code Status: Level 1 - Full Code    Subjective:   Patient seen and examined, patient is sitting up in a chair finishing up breakfast   She has no new complaints  Her only concern is a pleuritic chest pain, with resultant right middle back pain  Pain is described as a 4/10, and is dull  No radiation    Aggravating factors included deep breaths, no clear-cut alleviating factors    Objective:     Vitals:   Temp (24hrs), Av 1 °F (36 7 °C), Min:96 3 °F (35 7 °C), Max:100 2 °F (37 9 °C)    Temp:  [96 3 °F (35 7 °C)-100 2 °F (37 9 °C)] 97 5 °F (36 4 °C)  HR:  [] 61  Resp:  [10-51] 13  BP: ()/(51-65) 96/53  SpO2:  [86 %-98 %] 98 %  Body mass index is 37 82 kg/m²  Input and Output Summary (last 24 hours): Intake/Output Summary (Last 24 hours) at 4/12/2021 0932  Last data filed at 4/12/2021 0900  Gross per 24 hour   Intake 1190 ml   Output 350 ml   Net 840 ml       Physical Exam:   Physical Exam  Constitutional:       General: She is not in acute distress  Appearance: Normal appearance  She is normal weight  She is not ill-appearing  HENT:      Head: Normocephalic and atraumatic  Nose: Nose normal       Mouth/Throat:      Mouth: Mucous membranes are moist    Eyes:      Extraocular Movements: Extraocular movements intact  Pupils: Pupils are equal, round, and reactive to light  Neck:      Musculoskeletal: Normal range of motion and neck supple  No neck rigidity  Cardiovascular:      Rate and Rhythm: Normal rate and regular rhythm  Pulses: Normal pulses  Heart sounds: Normal heart sounds  No murmur  No friction rub  No gallop  Pulmonary:      Effort: Pulmonary effort is normal  No respiratory distress  Breath sounds: Normal breath sounds  No wheezing, rhonchi or rales  Comments: Decreased breath sounds bilaterally at the bases right greater than left  Abdominal:      General: There is no distension  Palpations: Abdomen is soft  There is no mass  Tenderness: There is no abdominal tenderness  Hernia: No hernia is present  Musculoskeletal: Normal range of motion  General: No swelling or tenderness  Right lower leg: No edema  Left lower leg: No edema  Skin:     General: Skin is warm  Capillary Refill: Capillary refill takes less than 2 seconds  Findings: No erythema or rash  Neurological:      General: No focal deficit present  Mental Status: She is alert and oriented to person, place, and time  Mental status is at baseline  Cranial Nerves: No cranial nerve deficit  Motor: No weakness     Psychiatric:         Mood and Affect: Mood normal          Behavior: Behavior normal          Additional Data:     Labs:    Results from last 7 days   Lab Units 04/12/21  0430  04/11/21  1303   WBC Thousand/uL 29 50*  --  17 00*   HEMOGLOBIN g/dL 11 5*  --  12 3   HEMATOCRIT % 35 3*  --  37 4*   PLATELETS Thousands/uL 290   < > 245   LYMPHO PCT %  --   --  6*   MONO PCT %  --   --  2*    < > = values in this interval not displayed  Results from last 7 days   Lab Units 04/12/21  0430 04/11/21  1303   SODIUM mmol/L 135 138   POTASSIUM mmol/L 3 9 3 3*   CHLORIDE mmol/L 97* 97*   CO2 mmol/L 26 30   BUN mg/dL 20 23   CREATININE mg/dL 0 75 0 70   CALCIUM mg/dL 8 5* 9 0   ALK PHOS U/L  --  58   ALT U/L  --  30   AST U/L  --  26     Results from last 7 days   Lab Units 04/11/21  1303   INR  1 04     Results from last 7 days   Lab Units 04/12/21  0717 04/11/21  2044 04/11/21  1550 04/11/21  1300   POC GLUCOSE mg/dl 226* 126 109 119           * I Have Reviewed All Lab Data Listed Above  * Additional Pertinent Lab Tests Reviewed: [address] Admission  Reviewed    Imaging:  Imaging Reports Reviewed Today Include:  None    Recent Cultures (last 7 days):     Results from last 7 days   Lab Units 04/11/21  1310 04/11/21  1303   BLOOD CULTURE  Received in Microbiology Lab  Culture in Progress  Received in Microbiology Lab  Culture in Progress         Last 24 Hours Medication List:   Current Facility-Administered Medications   Medication Dose Route Frequency Provider Last Rate    albuterol  2 5 mg Nebulization Q4H PRN Tim Kincaid MD      aspirin  81 mg Oral Daily Tim Kincaid MD      atorvastatin  40 mg Oral Daily Tim Kincaid MD      calcitonin (salmon)  1 spray Alternating Nares Daily Tim Kincaid MD      cefepime  2,000 mg Intravenous Q12H Tim Kincaid MD Stopped (04/12/21 0200)    enoxaparin  40 mg Subcutaneous Daily Tim Kincaid MD      gabapentin  300 mg Oral TID Tim Kincaid MD     [facility] insulin detemir  50 Units Subcutaneous HS Rommel Esparza MD      insulin lispro  1-6 Units Subcutaneous TID AC Rommel Alves MD      insulin lispro  1-6 Units Subcutaneous HS Cherie Madrigal MD      ipratropium  0 5 mg Nebulization TID Cherie Madrigal MD      levalbuterol  1 25 mg Nebulization TID Cherie Madrigal MD      metroNIDAZOLE  500 mg Intravenous Priscilla Fine MD      predniSONE  2 5 mg Oral Daily Cherie Madrigal MD      traMADol  50 mg Oral Q6H PRN Cherie Madrigal MD      vancomycin  17 5 mg/kg (Adjusted) Intravenous Q12H Cherie Madrigal MD 1,250 mg (04/12/21 0341)        Today, Patient Was Seen By: Joanna Holder MD    ** Please Note: Dictation voice to text software may have been used in the creation of this document   **

## 2021-04-12 NOTE — CASE MANAGEMENT
Evaluated the pt at the bedside  Pt was admitted to the hospital for AMS  Explained the role of CM and the options of discharge planning with the pt  Pt states her daughter lives with her in her 2 story home with 2 VON and 12 steps to the upstairs  Pt indicated PTA she was independent and drove  PT states she  has has a walker and cane at home, uses a cane  Pt reports she has Medicare and [employer] secondary cards at home, did not bring to the hospital   Pt PCP is Dr Nelly Kang  Pt uses [address]  Pt states her family will transport her home upon discharge  Will follow for any care needs  Patient/caregiver received discharge checklist   Content reviewed  Patient/caregiver encouraged to participate in discharge plan of care prior to discharge home  CM reviewed d/c planning process including the following: identifying help at home, patient preference for d/c planning needs, availability of treatment team to discuss questions or concerns patient and/or family may have regarding understanding medications and recognizing signs and symptoms once discharged  CM also encouraged patient to follow up with all recommended appointments after discharge  Patient advised of importance for patient and family to participate in managing patients medical well being

## 2021-04-13 VITALS
SYSTOLIC BLOOD PRESSURE: 113 MMHG | WEIGHT: 206.79 LBS | DIASTOLIC BLOOD PRESSURE: 57 MMHG | HEIGHT: 62 IN | RESPIRATION RATE: 20 BRPM | OXYGEN SATURATION: 94 % | TEMPERATURE: 98.3 F | BODY MASS INDEX: 38.05 KG/M2 | HEART RATE: 74 BPM

## 2021-04-13 LAB
ALBUMIN SERPL BCP-MCNC: 3 G/DL (ref 3.5–5.7)
ALP SERPL-CCNC: 40 U/L (ref 55–165)
ALT SERPL W P-5'-P-CCNC: 22 U/L (ref 7–52)
ANION GAP SERPL CALCULATED.3IONS-SCNC: 7 MMOL/L (ref 4–13)
AST SERPL W P-5'-P-CCNC: 22 U/L (ref 13–39)
BASOPHILS # BLD AUTO: 0.1 THOUSANDS/ΜL (ref 0–0.1)
BASOPHILS NFR BLD AUTO: 1 % (ref 0–2)
BILIRUB SERPL-MCNC: 0.5 MG/DL (ref 0.2–1)
BUN SERPL-MCNC: 13 MG/DL (ref 7–25)
CALCIUM ALBUM COR SERPL-MCNC: 8.8 MG/DL (ref 8.3–10.1)
CALCIUM SERPL-MCNC: 8 MG/DL (ref 8.6–10.5)
CHLORIDE SERPL-SCNC: 106 MMOL/L (ref 98–107)
CO2 SERPL-SCNC: 28 MMOL/L (ref 21–31)
CREAT SERPL-MCNC: 0.59 MG/DL (ref 0.6–1.2)
EOSINOPHIL # BLD AUTO: 0.3 THOUSAND/ΜL (ref 0–0.61)
EOSINOPHIL NFR BLD AUTO: 2 % (ref 0–5)
ERYTHROCYTE [DISTWIDTH] IN BLOOD BY AUTOMATED COUNT: 13.4 % (ref 11.5–14.5)
GFR SERPL CREATININE-BSD FRML MDRD: 94 ML/MIN/1.73SQ M
GLUCOSE SERPL-MCNC: 97 MG/DL (ref 65–99)
GLUCOSE SERPL-MCNC: 98 MG/DL (ref 65–140)
HCT VFR BLD AUTO: 29.1 % (ref 42–47)
HGB BLD-MCNC: 9.8 G/DL (ref 12–16)
LYMPHOCYTES # BLD AUTO: 1.8 THOUSANDS/ΜL (ref 0.6–4.47)
LYMPHOCYTES NFR BLD AUTO: 12 % (ref 21–51)
MCH RBC QN AUTO: 29.3 PG (ref 26–34)
MCHC RBC AUTO-ENTMCNC: 33.5 G/DL (ref 31–37)
MCV RBC AUTO: 87 FL (ref 81–99)
MONOCYTES # BLD AUTO: 0.9 THOUSAND/ΜL (ref 0.17–1.22)
MONOCYTES NFR BLD AUTO: 6 % (ref 2–12)
MRSA NOSE QL CULT: NORMAL
NEUTROPHILS # BLD AUTO: 12.3 THOUSANDS/ΜL (ref 1.4–6.5)
NEUTS SEG NFR BLD AUTO: 80 % (ref 42–75)
PLATELET # BLD AUTO: 200 THOUSANDS/UL (ref 149–390)
PMV BLD AUTO: 7.5 FL (ref 8.6–11.7)
POTASSIUM SERPL-SCNC: 3.6 MMOL/L (ref 3.5–5.5)
PROCALCITONIN SERPL-MCNC: 2.75 NG/ML
PROT SERPL-MCNC: 5.7 G/DL (ref 6.4–8.9)
RBC # BLD AUTO: 3.34 MILLION/UL (ref 3.9–5.2)
SODIUM SERPL-SCNC: 141 MMOL/L (ref 134–143)
WBC # BLD AUTO: 15.3 THOUSAND/UL (ref 4.8–10.8)

## 2021-04-13 PROCEDURE — 82948 REAGENT STRIP/BLOOD GLUCOSE: CPT

## 2021-04-13 PROCEDURE — 84145 PROCALCITONIN (PCT): CPT | Performed by: HOSPITALIST

## 2021-04-13 PROCEDURE — 80053 COMPREHEN METABOLIC PANEL: CPT | Performed by: HOSPITALIST

## 2021-04-13 PROCEDURE — 97110 THERAPEUTIC EXERCISES: CPT

## 2021-04-13 PROCEDURE — 94760 N-INVAS EAR/PLS OXIMETRY 1: CPT

## 2021-04-13 PROCEDURE — 85025 COMPLETE CBC W/AUTO DIFF WBC: CPT | Performed by: HOSPITALIST

## 2021-04-13 PROCEDURE — 94640 AIRWAY INHALATION TREATMENT: CPT

## 2021-04-13 PROCEDURE — 99239 HOSP IP/OBS DSCHRG MGMT >30: CPT | Performed by: HOSPITALIST

## 2021-04-13 RX ORDER — LEVOFLOXACIN 750 MG/1
750 TABLET ORAL EVERY 24 HOURS
Qty: 5 TABLET | Refills: 0 | Status: SHIPPED | OUTPATIENT
Start: 2021-04-13 | End: 2021-04-18

## 2021-04-13 RX ADMIN — LEVALBUTEROL HYDROCHLORIDE 1.25 MG: 1.25 SOLUTION, CONCENTRATE RESPIRATORY (INHALATION) at 07:50

## 2021-04-13 RX ADMIN — VANCOMYCIN HYDROCHLORIDE 1250 MG: 1 INJECTION, POWDER, LYOPHILIZED, FOR SOLUTION INTRAVENOUS at 03:04

## 2021-04-13 RX ADMIN — METRONIDAZOLE 500 MG: 500 SOLUTION INTRAVENOUS at 01:29

## 2021-04-13 RX ADMIN — CEFEPIME HYDROCHLORIDE 2000 MG: 2 INJECTION, SOLUTION INTRAVENOUS at 02:02

## 2021-04-13 RX ADMIN — IPRATROPIUM BROMIDE 0.5 MG: 0.5 SOLUTION RESPIRATORY (INHALATION) at 07:50

## 2021-04-13 RX ADMIN — GABAPENTIN 300 MG: 300 CAPSULE ORAL at 09:28

## 2021-04-13 RX ADMIN — ASPIRIN 81 MG: 81 TABLET, COATED ORAL at 09:28

## 2021-04-13 RX ADMIN — LEVOFLOXACIN 750 MG: 500 TABLET, FILM COATED ORAL at 09:28

## 2021-04-13 RX ADMIN — PREDNISONE 2.5 MG: 1 TABLET ORAL at 09:28

## 2021-04-13 NOTE — INCIDENTAL FINDINGS
The following findings require follow up:  Radiographic finding   Findin   Right upper lobe consolidation, likely bacterial pneumonia in the appropriate clinical setting  As this was not well                                visualized on radiograph, 3 month follow-up chest CT is recommended to ensure resolution       Follow up required:  Repeat CT chest in 3 months   Follow up should be done within 3 month(s)    Please notify the following clinician to assist with the follow up:   Dr Meyer Pulse

## 2021-04-13 NOTE — DISCHARGE INSTR - AVS FIRST PAGE
Dear Marjan Eaton,     It was our pleasure to care for you here at [facility]  It is our hope that we were always able to exceed the expected standards for your care during your stay  You were hospitalized due to sepsis caused by a pneumonia  You were cared for on the ICU floor by Aniyah Diaz MD with the [facility] who covers for your primary care physician (PCP), Lesa Bauer MD, while you were hospitalized  If you have any questions or concerns related to this hospitalization, you may contact us at [phone]  For follow up as well as any medication refills, we recommend that you follow up with your primary care physician  A registered nurse will reach out to you by phone within a few days after your discharge to answer any additional questions that you may have after going home  However, at this time we provide for you here, the most important instructions / recommendations at discharge:     · Notable Medication Adjustments -   · New prescription levofloxacin 750 mg-take 1 tablet daily by mouth for 5 days  · Okay to resume all other pre-admission medications at the preadmission dosages  · Testing Required after Discharge -   · Please ensure that your primary care provider orders a repeat CT scan of the chest in 3 months to ensure pneumonia resolution  · Important follow up information -   · Please follow-up with the providers as outlined in this discharge package  · Other Instructions -   · Please maintain a healthy diabetic diet  · Please follow-up with the outpatient physical therapy services that case management set up for you  · Please review this entire after visit summary as additional general instructions including medication list, appointments, activity, diet, any pertinent wound care, and other additional recommendations from your care team that may be provided for you        Sincerely,     Aniyah Diaz MD

## 2021-04-13 NOTE — ASSESSMENT & PLAN NOTE
· All sepsis parameters have resolved  · Patient is no longer tachypneic, tachycardic and or hypoxic  · White blood cell count is down to 15,000  · Secondary to an acute right upper lobe bacterial pneumonia -community acquired-as evident on CT scan, this correlates with her elevated procalcitonin at 6 91  · All other cultures have been negative to date  · Initial considerations for an infectious diarrhea have been ruled out, patient has had no further diarrheal episodes  · Status post treatment with vancomycin, cefepime and Flagyl  · Will transition the patient to 750 mg of oral levofloxacin  · Patient is medically stable for discharge  · Will DC home on 5 more days worth of Levaquin as outlined above  · Patient will get a repeat outpatient CT scan of her chest in 3 months to ensure complete pneumonia resolution  · Outpatient follow-up with PCP  · Okay for DC home on all other pre-admission medications at the preadmission dosages

## 2021-04-13 NOTE — OCCUPATIONAL THERAPY NOTE
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 [employer]    Pain Assessment   Pain Assessment Tool Pain Assessment not indicated - pt denies pain   ADL   Where Assessed   (pt  reports baseline ability at self-care earlier today)   Therapeutic Excerise-Strength   UE Strength Yes   Right Upper Extremity- Strength   R Shoulder Flexion; Horizontal ABduction; Other (Comment)  (pro/re-traction )   R Elbow Elbow flexion;Elbow extension  (in forward And reverse;  Also: supin/pron-ation )   R Weight/Reps/Sets 1 pound weight - 10 reps shoulders, 15 reps elbows    RUE Strength Comment patient with good interest in techniques and verbalized interest in continuing with some exers  at home   (Is to schedule OP PT)   Left Upper Extremity-Strength   L Weights/Reps/Sets all exers  same as RUE above    Coordination   Gross Motor WFL   Dexterity appears WFL   Cognition   Overall Cognitive Status WFL   Arousal/Participation Alert; Cooperative   Attention Within functional limits   Orientation Level Oriented X4   Following Commands Follows all commands and directions without difficulty   Activity Tolerance   Activity Tolerance Patient tolerated treatment well  (light session - patient to be discharged today )   Assessment   Assessment Patient participated in Skilled OT session this date with interventions consisting of therapeutic exercise to: increase functional use of BUEs, increase BUE muscle strength    Patient agreeable to OT treatment session, upon arrival patient was found seated OOB to Chair  Patient requiring verbal cues for correct technique and occasional rest periods  Patient denied concerns regarding return to her normal ADL levels of participation    Patient continues to be functioning below baseline level, occupational performance remains limited secondary to factors listed above and increased risk for falls and injury  From OT standpoint, recommendation at time of d/c would be Home with family support and Outpatient OT as warranted at time of discharge  Patient to benefit from continued Occupational Therapy treatment while in the hospital to address deficits as defined above and maximize level of functional independence with ADLs and functional mobility  Plan   Treatment Interventions UE strengthening/ROM; Patient/family training; Activityengagement   Goal Expiration Date 04/22/21   OT Treatment Day 1   Recommendation   Additional Comments 2 The patient's raw score on the AM-PAC Daily Activity inpatient short form is 24, standardized score is 57 54, greater than 39 4  Patients at this level are likely to benefit from discharge to home  Please refer to the recommendation of the Occupational Therapist for safe discharge planning     AM-PAC Daily Activity Inpatient   Lower Body Dressing 4   Bathing 4   Toileting 4   Upper Body Dressing 4   Grooming 4   Eating 4   Daily Activity Raw Score 24   Daily Activity Standardized Score (Calc for Raw Score >=11) 57 54   AM-[facility] Applied Cognition Inpatient   Following a Speech/Presentation 4   Understanding Ordinary Conversation 4   Taking Medications 4   Remembering Where Things Are Placed or Put Away 4   Remembering List of 4-5 Errands 4   Taking Care of Complicated Tasks 4   Applied Cognition Raw Score 24   Applied Cognition Standardized Score 62 21   PRABHU Franks/AGUSTIN

## 2021-04-13 NOTE — ASSESSMENT & PLAN NOTE
Lab Results   Component Value Date    HGBA1C 8 9 (H) 01/08/2021       Recent Labs     04/12/21  1107 04/12/21  1621 04/12/21  2117 04/13/21  0451   POCGLU 301* 255* 185* 98       Blood Sugar Average: Last 72 hrs:  (P) 177 375   · DC home on pre-admit meds at pre-admit dosages

## 2021-04-13 NOTE — ASSESSMENT & PLAN NOTE
· Troponin trend as follows:  0 04, 0 05, 0 04  · No true rise and or fall  · Suspect that this is a non MI related elevated troponinemia secondary to sepsis  · EKGs grossly within normal limits, patient is asymptomatic, and denies ever having had any recent chest pain  · Outpatient follow-up with PCP

## 2021-04-13 NOTE — CASE MANAGEMENT
Pt has been evaluated by [facility] and is stable to be discharged  PT was recommending home PT  Pt states she would rather go to outpt PT where where she  could use the machines  An order was made for same  Provided the pt the list of  outpt PT [facility]  Family will transport home

## 2021-04-13 NOTE — ASSESSMENT & PLAN NOTE
· Resolved - most likely secondary to the sepsis which  improved with IV antibiotics  · CT scan of the head was within normal limits  · Patient is AAO x3  · No further inpatient testing, treatment and/or workup  · Okay for discharge home

## 2021-04-13 NOTE — PLAN OF CARE
Problem: OCCUPATIONAL THERAPY ADULT  Goal: Performs self-care activities at highest level of function for planned discharge setting  See evaluation for individualized goals  Description: Treatment Interventions: ADL retraining, Functional transfer training, UE strengthening/ROM, Endurance training, Patient/family training          See flowsheet documentation for full assessment, interventions and recommendations  Outcome: Progressing  Note: Limitation: Decreased ADL status, Decreased UE strength, Decreased endurance, Decreased self-care trans, Decreased high-level ADLs  Prognosis: Good  Assessment: Patient participated in Skilled OT session this date with interventions consisting of therapeutic exercise to: increase functional use of BUEs, increase BUE muscle strength    Patient agreeable to OT treatment session, upon arrival patient was found seated OOB to Chair  Patient requiring verbal cues for correct technique and occasional rest periods  Patient denied concerns regarding return to her normal ADL levels of participation  Patient continues to be functioning below baseline level, occupational performance remains limited secondary to factors listed above and increased risk for falls and injury  From OT standpoint, recommendation at time of d/c would be Home with family support and Outpatient OT as warranted at time of discharge  Patient to benefit from continued Occupational Therapy treatment while in the hospital to address deficits as defined above and maximize level of functional independence with ADLs and functional mobility  OT Discharge Recommendation: Other (Comment)(OP OT   Return to previous environment c social )  OT - OK to Discharge: Yes(Once medically cleared)     STEFAN Son

## 2021-04-16 LAB
BACTERIA BLD CULT: NORMAL
BACTERIA BLD CULT: NORMAL

## 2021-05-04 NOTE — PROGRESS NOTES
PT Evaluation     Today's date: 2021  Patient name: Aubrey Agustin  : 1950  MRN: [id number]  Referring provider: Dafne White, * Dr Rey Benton  Dx:   Encounter Diagnosis     ICD-10-CM    1  Class 3 severe obesity due to excess calories with serious comorbidity in adult, unspecified BMI ([facility] [address] )  E66 01    2  Weakness generalized  R53 1    3  Physical deconditioning  R53 81                   Assessment  Assessment details: Aubrey Agustin is a 72 y o  female with a history of lupus, arthritis, DM, curvature of spine that presents for a moderate complexity physical therapy initial evaluation  The patient demonstrates signs and symptoms consistent with class 3 morbid obesity with comorbidity, generalized weakness, and deconditioning  During the examination the patient demonstrated decreased core/L UE/LE strength, decreased endurance, gait dysfunction, decreased balance, poor posture, and low back pain  The patient's impairments are causing the following functional limitations: difficulty with prolonged standing, prolonged walking, difficulty bending/stooping, difficulty lifting/carrying objects, walking on unlevel surfaces, difficulty squatting/kneeling, difficulty stair-climbing, and difficulty transferring from low surfaces     The patient's clinical presentation is evolving due to a number of participation restrictions, significant medial history, and functional limitation (PSFS AV 33/10)  The patient will benefit from skilled PT services to address impairments, work towards goals, and restore PLOF        Impairments: abnormal or restricted ROM, activity intolerance, impaired balance, impaired physical strength, lacks appropriate home exercise program, pain with function and poor posture   Functional limitations: difficulty with prolonged standing, prolonged walking, difficulty bending/stooping, difficulty lifting/carrying objects, walking on unlevel surfaces, difficulty squatting/kneeling, difficulty stair-climbing, and difficulty transferring from low surfaces  Understanding of Dx/Px/POC: fair   Prognosis: fair    Goals  STG: achieve in 4-6 weeks  1  LBP pain less than 2/10/10 at worst to allow improved activity tolerance  2   Patient's L LE and core strength improve by 1/2-1 muscle grade to improve ambulation distance  3  30 second sit to stand score improve by 5 stands to a total of 12 to indicate improved transfers       LTG:  Achieve in 6-12 weeks  1  Patient's PSFS AVG score less than 3 0/10 to indicate a return of normal function/strength  2  Patient's tandem stance balance improve to >20 seconds without LOB B/L LE to indicate normal balance  3   Patient walk > 10 mins, lift/carry objects, and open jars without difficulty to achieve their personal goal           Plan  Plan details: RE-ASSESS 1X/MONTH  Patient would benefit from: skilled physical therapy  Planned modality interventions: TENS, cryotherapy, thermotherapy: hydrocollator packs and ultrasound  Other planned modality interventions: IAS  Planned therapy interventions: joint mobilization, manual therapy, massage, neuromuscular re-education, patient education, postural training, self care, strengthening, stretching, therapeutic activities, therapeutic exercise, home exercise program, abdominal trunk stabilization, activity modification, balance, body mechanics training, gait training and balance/weight bearing training  Frequency: 1-3x/wk  Duration in weeks: 12  Plan of Care beginning date: 5/6/2021  Plan of Care expiration date: 8/5/2021  Treatment plan discussed with: PTA and patient        Subjective Evaluation    History of Present Illness  Date of onset: 4/11/2021  Mechanism of injury: Lis Kiran is a 72 y o  female that presents to outpatient physical therapy with complaints of weakness and deconditioned  The patient reports recent hospitalization for pneumonia 4/11-4/13 which caused increased weakness/deconditioning  The patient followed up with her PCP 1 week after the hospitalization and the patient was referred to outpatient PT  The patient complains of difficulty prolonged walking unsupported ( > 5 mins), difficulty lifting/carrying objects, and difficulty opening jars  The patient's main goal for physical therapy is to walk longer, lift, and open jars with less difficulty  Patient notes chronic back pain being treated by pain management with steroid injections 1x/ 3 months       Pain  Current pain rating: 3  At best pain rating: 3  At worst pain ratin  Location: lumbar spine  Quality: dull ache  Relieving factors: medications  Aggravating factors: walking, standing and lifting  Progression: no change    Social Support  Steps to enter house: yes  Stairs in house: yes   Lives in: multiple-level home  Lives with: adult children and young children    Employment status: working ( part time)  Hand dominance: right    Treatments  Current treatment: physical therapy  Patient Goals  Patient goals for therapy: improved balance, increased motion, increased strength, independence with ADLs/IADLs, return to sport/leisure activities and return to work  Patient goal: Walk longer distances, lift without difficulty, and open jars        Objective     Static Posture     Shoulders  Asymmetric shoulders  Lumbar Spine   Flattened  Lumbar spine (Left): Shifted  Neurological Testing     Sensation   Cervical/Thoracic   Left   Intact: light touch  Paresthesia: light touch    Right   Intact: light touch    Comments   Left light touch: left 5th digit  -constant       Lumbar   Left   Intact: light touch    Right   Intact: light touch    Strength/Myotome Testing   Cervical Spine     Right   Interossei strength (t1): 3+    Left Shoulder     Planes of Motion   Flexion: 4+   Extension: 4+   Abduction: 4   External rotation at 0°: 4   Internal rotation at 0°: 4     Right Shoulder     Planes of Motion   Flexion: 4   Extension: 4 Abduction: 4   External rotation at 0°: 4-   Internal rotation at 0°: 4-     Left Elbow   Flexion: 4+  Extension: 4+    Right Elbow   Flexion: 4  Extension: 4    Left Wrist/Hand   Wrist extension: 4+  Wrist flexion: 4+  Thumb extension: 4    Right Wrist/Hand   Wrist extension: 4  Wrist flexion: 4  Thumb extension: 4    Left Hip   Planes of Motion   Flexion: 4-  Extension: 3+  Abduction: 4-  Adduction: 4    Right Hip   Planes of Motion   Flexion: 4  Extension: 4-  Abduction: 4  Adduction: 4+    Left Knee   Flexion: 4  Extension: 4-    Right Knee   Flexion: 4  Extension: 4-    Left Ankle/Foot   Dorsiflexion: 5  Plantar flexion: 5    Right Ankle/Foot   Dorsiflexion: 5  Plantar flexion: 5    Muscle Activation     Additional Muscle Activation Details  Decreased TrA, multifidus, gluteal activation with single leg activities / Transfers      Tests     Additional Tests Details  30 SSTS: 7 stands no hands  PSFS: AVG SCORE 7 33/10    TU seconds    6 minute walk test: 3 rest breaks - 9 laps - 594 feet    Gait impairments: Patient ambulates with no AD WBAT B/L LE  The patient demonstrates slow gait speed, unequal step lengths, externally rotated L hip, and decreased heel-toe rollover                           Precautions: NONE  RE: 6/3    Specialty Daily Treatment Diary     Manual  5/6                                                   Ther Ex 5/6       TM walk        Total gym  *      Prone press ups  *      Stand lumbar extensions 3x10       Stand UBE ALT 90/70  *      Bridges        Leg press        T-spine/L-spine extensions        Nu -step S= 12  *L3      Neuro Re-Ed        Alt step taps XL  *      Foam Balance feet together EO                       EC        bridges  *      Clam shells  *      Tandem Stand EO 2x:20       MTP/LTP/ Antirotations  *              Fitter Board front/side  *      Seated T-ball marches        Gait Training        Heel-toe amb  *      Hurdles Amb OBO  *      Shop walk  *      6 minute walk with cues 9 laps 3 rests no AD       sidestepping                        Ther Activity        Chair Squats x7 no H       Up/down steps SOS                        Chair squats with step taps        Step ups fwd/lat  *          Modalities                                        The patient was given a new home exercise plan with written handout, pictures, and verbal instruction  The patient accepts and understands the new home activities

## 2021-05-06 ENCOUNTER — TRANSCRIBE ORDERS (OUTPATIENT)
Dept: PHYSICAL THERAPY | Facility: CLINIC | Age: 71
End: 2021-05-06

## 2021-05-06 ENCOUNTER — EVALUATION (OUTPATIENT)
Dept: PHYSICAL THERAPY | Facility: CLINIC | Age: 71
End: 2021-05-06
Payer: MEDICARE

## 2021-05-06 DIAGNOSIS — R53.81 PHYSICAL DECONDITIONING: ICD-10-CM

## 2021-05-06 DIAGNOSIS — R53.1 WEAKNESS GENERALIZED: ICD-10-CM

## 2021-05-06 DIAGNOSIS — E66.01 CLASS 3 SEVERE OBESITY DUE TO EXCESS CALORIES WITH SERIOUS COMORBIDITY IN ADULT, UNSPECIFIED BMI (HCC): Primary | ICD-10-CM

## 2021-05-06 PROCEDURE — 97162 PT EVAL MOD COMPLEX 30 MIN: CPT | Performed by: PHYSICAL THERAPIST

## 2021-05-06 PROCEDURE — 97110 THERAPEUTIC EXERCISES: CPT | Performed by: PHYSICAL THERAPIST

## 2021-05-06 NOTE — LETTER
May 6, 2021    MD Fernando [address] [id number]    Patient: Aris Schumacher   YOB: 1950   Date of Visit: 2021     Encounter Diagnosis     ICD-10-CM    1  Class 3 severe obesity due to excess calories with serious comorbidity in adult, unspecified BMI ([facility] [address] )  E66 01    2  Weakness generalized  R53 1    3  Physical deconditioning  R53 81        Dear Dr Felipe Suárez: Thank you for your recent referral of Aris Schumacher  Please review the attached evaluation summary from 's recent visit  Please verify that you agree with the plan of care by signing the attached order  If you have any questions or concerns, please do not hesitate to call  I sincerely appreciate the opportunity to share in the care of one of your patients and hope to have another opportunity to work with you in the near future  Sincerely,    Sandi Delgado, PT      Referring Provider:      I certify that I have read the below Plan of Care and certify the need for these services furnished under this plan of treatment while under my care  MD Fernando [address] [place] [address]  Via Fax: [phone]          PT Evaluation     Today's date: 2021  Patient name: Aris Schumacher  : 1950  MRN: [id number]  Referring provider: Francesco Fink, * Dr Felipe Suárez  Dx:   Encounter Diagnosis     ICD-10-CM    1  Class 3 severe obesity due to excess calories with serious comorbidity in adult, unspecified BMI ([facility] [address] )  E66 01    2  Weakness generalized  R53 1    3  Physical deconditioning  R53 81                   Assessment  Assessment details: Aris Schumacher is a 72 y o  female with a history of lupus, arthritis, DM, curvature of spine that presents for a moderate complexity physical therapy initial evaluation    The patient demonstrates signs and symptoms consistent with class 3 morbid obesity with comorbidity, generalized weakness, and deconditioning  During the examination the patient demonstrated decreased core/L UE/LE strength, decreased endurance, gait dysfunction, decreased balance, poor posture, and low back pain  The patient's impairments are causing the following functional limitations: difficulty with prolonged standing, prolonged walking, difficulty bending/stooping, difficulty lifting/carrying objects, walking on unlevel surfaces, difficulty squatting/kneeling, difficulty stair-climbing, and difficulty transferring from low surfaces     The patient's clinical presentation is evolving due to a number of participation restrictions, significant medial history, and functional limitation (PSFS AV 33/10)  The patient will benefit from skilled PT services to address impairments, work towards goals, and restore PLOF  Impairments: abnormal or restricted ROM, activity intolerance, impaired balance, impaired physical strength, lacks appropriate home exercise program, pain with function and poor posture   Functional limitations: difficulty with prolonged standing, prolonged walking, difficulty bending/stooping, difficulty lifting/carrying objects, walking on unlevel surfaces, difficulty squatting/kneeling, difficulty stair-climbing, and difficulty transferring from low surfaces  Understanding of Dx/Px/POC: fair   Prognosis: fair    Goals  STG: achieve in 4-6 weeks  1  LBP pain less than 2/10/10 at worst to allow improved activity tolerance  2   Patient's L LE and core strength improve by 1/2-1 muscle grade to improve ambulation distance  3  30 second sit to stand score improve by 5 stands to a total of 12 to indicate improved transfers       LTG:  Achieve in 6-12 weeks  1  Patient's PSFS AVG score less than 3 0/10 to indicate a return of normal function/strength  2  Patient's tandem stance balance improve to >20 seconds without LOB B/L LE to indicate normal balance    3   Patient walk > 10 mins, lift/carry objects, and open jars without difficulty to achieve their personal goal           Plan  Plan details: RE-ASSESS 1X/MONTH  Patient would benefit from: skilled physical therapy  Planned modality interventions: TENS, cryotherapy, thermotherapy: hydrocollator packs and ultrasound  Other planned modality interventions: IASTM  Planned therapy interventions: joint mobilization, manual therapy, massage, neuromuscular re-education, patient education, postural training, self care, strengthening, stretching, therapeutic activities, therapeutic exercise, home exercise program, abdominal trunk stabilization, activity modification, balance, body mechanics training, gait training and balance/weight bearing training  Frequency: 1-3x/wk  Duration in weeks: 12  Plan of Care beginning date: 2021  Plan of Care expiration date: 2021  Treatment plan discussed with: PTA and patient        Subjective Evaluation    History of Present Illness  Date of onset: 2021  Mechanism of injury: Jasmin Christianson is a 72 y o  female that presents to outpatient physical therapy with complaints of weakness and deconditioned  The patient reports recent hospitalization for pneumonia - which caused increased weakness/deconditioning  The patient followed up with her PCP 1 week after the hospitalization and the patient was referred to outpatient PT  The patient complains of difficulty prolonged walking unsupported ( > 5 mins), difficulty lifting/carrying objects, and difficulty opening jars  The patient's main goal for physical therapy is to walk longer, lift, and open jars with less difficulty      Patient notes chronic back pain being treated by pain management with steroid injections 1x/ 3 months       Pain  Current pain rating: 3  At best pain rating: 3  At worst pain ratin  Location: lumbar spine  Quality: dull ache  Relieving factors: medications  Aggravating factors: walking, standing and lifting  Progression: no change    Social Support  Steps to enter house: yes  Stairs in house: yes   Lives in: multiple-level home  Lives with: adult children and young children    Employment status: working ( part time)  Hand dominance: right    Treatments  Current treatment: physical therapy  Patient Goals  Patient goals for therapy: improved balance, increased motion, increased strength, independence with ADLs/IADLs, return to sport/leisure activities and return to work  Patient goal: Walk longer distances, lift without difficulty, and open jars        Objective     Static Posture     Shoulders  Asymmetric shoulders  Lumbar Spine   Flattened  Lumbar spine (Left): Shifted  Neurological Testing     Sensation   Cervical/Thoracic   Left   Intact: light touch  Paresthesia: light touch    Right   Intact: light touch    Comments   Left light touch: left 5th digit  -constant       Lumbar   Left   Intact: light touch    Right   Intact: light touch    Strength/Myotome Testing   Cervical Spine     Right   Interossei strength (t1): 3+    Left Shoulder     Planes of Motion   Flexion: 4+   Extension: 4+   Abduction: 4   External rotation at 0°: 4   Internal rotation at 0°: 4     Right Shoulder     Planes of Motion   Flexion: 4   Extension: 4   Abduction: 4   External rotation at 0°: 4-   Internal rotation at 0°: 4-     Left Elbow   Flexion: 4+  Extension: 4+    Right Elbow   Flexion: 4  Extension: 4    Left Wrist/Hand   Wrist extension: 4+  Wrist flexion: 4+  Thumb extension: 4    Right Wrist/Hand   Wrist extension: 4  Wrist flexion: 4  Thumb extension: 4    Left Hip   Planes of Motion   Flexion: 4-  Extension: 3+  Abduction: 4-  Adduction: 4    Right Hip   Planes of Motion   Flexion: 4  Extension: 4-  Abduction: 4  Adduction: 4+    Left Knee   Flexion: 4  Extension: 4-    Right Knee   Flexion: 4  Extension: 4-    Left Ankle/Foot   Dorsiflexion: 5  Plantar flexion: 5    Right Ankle/Foot   Dorsiflexion: 5  Plantar flexion: 5    Muscle Activation     Additional Muscle Activation Details  Decreased TrA, multifidus, gluteal activation with single leg activities / Transfers      Tests     Additional Tests Details  30 SSTS: 7 stands no hands  PSFS: AVG SCORE 7 33/10    TU seconds    6 minute walk test: 3 rest breaks - 9 laps - 594 feet    Gait impairments: Patient ambulates with no AD WBAT B/L LE  The patient demonstrates slow gait speed, unequal step lengths, externally rotated L hip, and decreased heel-toe rollover  Precautions: NONE  RE: 6/3    Specialty Daily Treatment Diary     Manual                                                     Ther Ex 5/6       TM walk        Total gym  *      Prone press ups  *      Stand lumbar extensions 3x10       Stand UBE ALT 90/70  *      Bridges        Leg press        T-spine/L-spine extensions        Nu -step S= 12  *L3      Neuro Re-Ed        Alt step taps XL  *      Foam Balance feet together EO                       EC        bridges  *      Clam shells  *      Tandem Stand EO 2x:20       MTP/LTP/ Antirotations  *              Fitter Board front/side  *      Seated T-ball marches        Gait Training        Heel-toe amb  *      Hurdles Amb OBO  *      Shop walk  *      6 minute walk with cues 9 laps 3 rests no AD       sidestepping                        Ther Activity        Chair Squats x7 no H       Up/down steps SOS                        Chair squats with step taps        Step ups fwd/lat  *          Modalities                                        The patient was given a new home exercise plan with written handout, pictures, and verbal instruction  The patient accepts and understands the new home activities

## 2021-05-11 ENCOUNTER — OFFICE VISIT (OUTPATIENT)
Dept: PHYSICAL THERAPY | Facility: CLINIC | Age: 71
End: 2021-05-11
Payer: MEDICARE

## 2021-05-11 DIAGNOSIS — R53.81 PHYSICAL DECONDITIONING: ICD-10-CM

## 2021-05-11 DIAGNOSIS — E66.01 CLASS 3 SEVERE OBESITY DUE TO EXCESS CALORIES WITH SERIOUS COMORBIDITY IN ADULT, UNSPECIFIED BMI (HCC): Primary | ICD-10-CM

## 2021-05-11 DIAGNOSIS — R53.1 WEAKNESS GENERALIZED: ICD-10-CM

## 2021-05-11 PROCEDURE — 97110 THERAPEUTIC EXERCISES: CPT | Performed by: PHYSICAL THERAPIST

## 2021-05-11 PROCEDURE — 97112 NEUROMUSCULAR REEDUCATION: CPT | Performed by: PHYSICAL THERAPIST

## 2021-05-11 NOTE — PROGRESS NOTES
Daily Note     Today's date: 2021  Patient name: Eran Aceves  : 1950  MRN: [id number]  Referring provider: Otilia Powell, *  Dx:   Encounter Diagnosis     ICD-10-CM    1  Class 3 severe obesity due to excess calories with serious comorbidity in adult, unspecified BMI ([facility] [address] )  E66 01    2  Weakness generalized  R53 1    3  Physical deconditioning  R53 81                   Subjective: The patient notes she had difficulty with the tandem standing at home  Denies pain  Objective: See treatment diary below      Assessment: The patient tolerated all activities well today  The patient needed verbal and manual cues for proper posture and technique to perform the exercises properly  There were no complaints of increased pain or problems after the session today  The patient will benefit from continued skilled physical therapy to progress towards achieving patient centered goals  Plan: Continue per plan of care  Progress treatment as tolerated  Focus on balance and endurance as able           Precautions: NONE  RE: 6/3    Specialty Daily Treatment Diary     Manual                                                    Ther Ex       TM walk        Total gym  *      Prone on elbows  Do first 10x      Prone press ups  *2x5 then second      Stand lumbar extensions 3x10       Stand UBE ALT 90/70  *100/70 x 2 mins              Leg press        T-spine extensions        Nu -step S= 7  *L3 x 8 mins      Neuro Re-Ed        Alt step taps XL  *4" x15      Foam Balance feet together EO                       EC        bridges  *x10      Clam shells  *x15      Tandem Stand EO 2x:20       MTP/LTP/ Antirotations  *              Fitter Board front/side  *      Seated T-ball marches        Gait Training        Heel-toe amb  *counter 10ft x 8      Hurdles Amb OBO  *      Shop walk  *      6 minute walk with cues 9 laps 3 rests no AD       sidestepping  *                      Ther Activity Chair Squats x7 no H Low mat x10      Up/down steps SOS                        Chair squats with step taps        Step ups fwd/lat  *4" x10 B/L          Modalities                                          The patient was given a new home exercise plan with written handout, pictures, and verbal instruction  The patient accepts and understands the new home activities

## 2021-05-14 ENCOUNTER — OFFICE VISIT (OUTPATIENT)
Dept: PHYSICAL THERAPY | Facility: CLINIC | Age: 71
End: 2021-05-14
Payer: MEDICARE

## 2021-05-14 DIAGNOSIS — E66.01 CLASS 3 SEVERE OBESITY DUE TO EXCESS CALORIES WITH SERIOUS COMORBIDITY IN ADULT, UNSPECIFIED BMI (HCC): Primary | ICD-10-CM

## 2021-05-14 DIAGNOSIS — R53.1 WEAKNESS GENERALIZED: ICD-10-CM

## 2021-05-14 DIAGNOSIS — R53.81 PHYSICAL DECONDITIONING: ICD-10-CM

## 2021-05-14 PROCEDURE — 97110 THERAPEUTIC EXERCISES: CPT

## 2021-05-14 PROCEDURE — 97112 NEUROMUSCULAR REEDUCATION: CPT

## 2021-05-14 NOTE — PROGRESS NOTES
Daily Note     Today's date: 2021  Patient name: Farnaz James  : 1950  MRN: [id number]  Referring provider: Adonis Frances, *  Dx:   Encounter Diagnosis     ICD-10-CM    1  Class 3 severe obesity due to excess calories with serious comorbidity in adult, unspecified BMI ([facility] [address] )  E66 01    2  Weakness generalized  R53 1    3  Physical deconditioning  R53 81                   Subjective: Pt reports "I'm doing OK"  Pt notes that she is having some problems with her knee  Pt reports that about a year ago she had injections in her knee and she has put the brace on her knee  Pt feels her knee might be bothered by the squats  Pt reports that she had increased LBP which wrapped around her L side from HEP prone extension ex  Objective: See treatment diary below      Assessment: Pt has pain with prone extension on elbows in back, ex held today due to increased pain  Pt advised to hold ex on HEP due to increased pain  Added PB forward flexion and side bend stretching to decrease LBP from extension ex which helped pt and pt felt better  Plan: Continue per plan of care  Precautions: NONE  RE: /3    Specialty Daily Treatment Diary     Manual                                                   Ther Ex      TM walk        Total gym  * 15x     Prone on elbows  Do first 10x 10x      Prone press ups  *2x5 then second 10x     Stand lumbar extensions 3x10       Stand UBE ALT 90/70  *100/70 x 2 mins 6 min alt     PB FWD flex and side bend   5x 20" hold stretching     Leg press        T-spine extensions        Nu -step S= 7  *L3 x 8 mins L3 8 min       Neuro Re-Ed        Alt step taps XL  *4" x15 4" 15x ea     Foam Balance feet together EO                       EC        bridges  *x10 15x     Clam shells  *x15 15x 3" hold     Tandem Stand EO 2x:20       MTP/LTP/ Antirotations  *              Fitter Board front/side  *      Seated T-ball marches        Gait Training Heel-toe amb  *counter 10ft x 8      Hurdles Amb OBO  *      Shop walk  *      6 minute walk with cues 9 laps 3 rests no AD       sidestepping  * // 3 laps                     Ther Activity        Chair Squats x7 no H Low mat x10 On TG due to increased knee pain     Up/down steps SOS                        Chair squats with step taps        Step ups fwd/lat  *4" x10 B/L 15x ea 4" step         Modalities

## 2021-05-18 ENCOUNTER — OFFICE VISIT (OUTPATIENT)
Dept: PHYSICAL THERAPY | Facility: CLINIC | Age: 71
End: 2021-05-18
Payer: MEDICARE

## 2021-05-18 DIAGNOSIS — R53.81 PHYSICAL DECONDITIONING: ICD-10-CM

## 2021-05-18 DIAGNOSIS — R53.1 WEAKNESS GENERALIZED: ICD-10-CM

## 2021-05-18 DIAGNOSIS — E66.01 CLASS 3 SEVERE OBESITY DUE TO EXCESS CALORIES WITH SERIOUS COMORBIDITY IN ADULT, UNSPECIFIED BMI (HCC): Primary | ICD-10-CM

## 2021-05-18 PROCEDURE — 97530 THERAPEUTIC ACTIVITIES: CPT

## 2021-05-18 PROCEDURE — 97116 GAIT TRAINING THERAPY: CPT

## 2021-05-18 PROCEDURE — 97110 THERAPEUTIC EXERCISES: CPT

## 2021-05-18 PROCEDURE — 97112 NEUROMUSCULAR REEDUCATION: CPT

## 2021-05-18 NOTE — PROGRESS NOTES
Daily Note     Today's date: 2021  Patient name: Chelsea Taylor  : 1950  MRN: [id number]  Referring provider: Jamilah Pink, *  Dx:   Encounter Diagnosis     ICD-10-CM    1  Class 3 severe obesity due to excess calories with serious comorbidity in adult, unspecified BMI ([facility] [address] )  E66 01    2  Weakness generalized  R53 1    3  Physical deconditioning  R53 81        Start Time: 0844          Subjective: Patient states she has her normal aches and pains today  She feels the exercises are going well at home except the prone exercises  Objective: See treatment diary below      Assessment: Tolerated treatment well  Patient would benefit from continued PT for stretching and strengthening  Patient was able to add a few exercises to her program with little difficulty and pain  Her prone exercises bothered her back and these exercises will be assessed next visit  Patient felt ok, but tired by the end of the session  Plan: Continue per plan of care  Progress treatment as tolerated  Precautions: NONE  RE: 6/3    Specialty Daily Treatment Diary     Manual                                                  Ther Ex     TM walk        Total gym  * 15x x10    Prone on elbows  Do first 10x 10x  x10 discomfort hold   Prone press ups  *2x5 then second 10x x10 p! hold   Stand lumbar extensions 3x10       Stand UBE ALT 90/70  *100/70 x 2 mins 6 min alt 100/70 x6 min    SB FWD flex and side bend   5x 20" hold stretching 5x 20" hold stretching    Leg press        T-spine extensions        Nu -step S= 7  *L3 x 8 mins L3 8 min   L 3 x10 min    Neuro Re-Ed        Alt step taps XL  *4" x15 4" 15x ea 4" x15 ea 1H    Foam Balance feet together EO                       EC        bridges  *x10 15x x15    Clam shells  *x15 15x 3" hold x15    Tandem Stand EO 2x:20       MTP/LTP/ Antirotations  *   *           Fitter Board front/side w/stop  *  * x10 ea    Seated T-ball love        Gait Training        Heel-toe amb  *counter 10ft x 8  Cabinet 15ft x2    Hurdles Amb OBO  *   *   Shop walk  *  *25ft x2    6 minute walk with cues 9 laps 3 rests no AD       sidestepping  * // 3 laps 25ft                    Ther Activity        Chair Squats x7 no H Low mat x10 On TG due to increased knee pain Chair x5    Up/down steps SOS                        Chair squats with step taps        Step ups fwd/lat  *4" x10 B/L 15x ea 4" step 4" x10 B/L ea fwd/lat        Modalities

## 2021-05-21 ENCOUNTER — OFFICE VISIT (OUTPATIENT)
Dept: PHYSICAL THERAPY | Facility: CLINIC | Age: 71
End: 2021-05-21
Payer: MEDICARE

## 2021-05-21 DIAGNOSIS — R53.81 PHYSICAL DECONDITIONING: ICD-10-CM

## 2021-05-21 DIAGNOSIS — E66.01 CLASS 3 SEVERE OBESITY DUE TO EXCESS CALORIES WITH SERIOUS COMORBIDITY IN ADULT, UNSPECIFIED BMI (HCC): Primary | ICD-10-CM

## 2021-05-21 DIAGNOSIS — R53.1 WEAKNESS GENERALIZED: ICD-10-CM

## 2021-05-21 PROCEDURE — 97112 NEUROMUSCULAR REEDUCATION: CPT

## 2021-05-21 PROCEDURE — 97110 THERAPEUTIC EXERCISES: CPT

## 2021-05-21 PROCEDURE — 97116 GAIT TRAINING THERAPY: CPT

## 2021-05-21 PROCEDURE — 97530 THERAPEUTIC ACTIVITIES: CPT

## 2021-05-25 ENCOUNTER — APPOINTMENT (OUTPATIENT)
Dept: PHYSICAL THERAPY | Facility: CLINIC | Age: 71
End: 2021-05-25
Payer: MEDICARE

## 2021-05-28 ENCOUNTER — OFFICE VISIT (OUTPATIENT)
Dept: PHYSICAL THERAPY | Facility: CLINIC | Age: 71
End: 2021-05-28
Payer: MEDICARE

## 2021-05-28 DIAGNOSIS — E66.01 CLASS 3 SEVERE OBESITY DUE TO EXCESS CALORIES WITH SERIOUS COMORBIDITY IN ADULT, UNSPECIFIED BMI (HCC): Primary | ICD-10-CM

## 2021-05-28 DIAGNOSIS — R53.1 WEAKNESS GENERALIZED: ICD-10-CM

## 2021-05-28 DIAGNOSIS — R53.81 PHYSICAL DECONDITIONING: ICD-10-CM

## 2021-05-28 PROCEDURE — 97112 NEUROMUSCULAR REEDUCATION: CPT | Performed by: PHYSICAL THERAPIST

## 2021-05-28 PROCEDURE — 97116 GAIT TRAINING THERAPY: CPT | Performed by: PHYSICAL THERAPIST

## 2021-05-28 PROCEDURE — 97110 THERAPEUTIC EXERCISES: CPT | Performed by: PHYSICAL THERAPIST

## 2021-05-28 NOTE — PROGRESS NOTES
Daily Note     Today's date: 2021  Patient name: Lis Kiran  : 1950  MRN: [id number]  Referring provider: Danny Pedroza, *  Dx:   Encounter Diagnosis     ICD-10-CM    1  Class 3 severe obesity due to excess calories with serious comorbidity in adult, unspecified BMI ([facility] [address] )  E66 01    2  Weakness generalized  R53 1    3  Physical deconditioning  R53 81                   Subjective: The patient reports feeling a 4/10 pain across her low back      Objective: See treatment diary below      Assessment: We focused on performing shift corrections with extensions and the patient reports feeling central LBP and improved posture/mechanics with walking afterwards  The patient was encouraged to perform these exercises multiple times a day over the weekend and to hold on doing the other part of her HEP  The patient denies any pain increase or problems at the end of the treatment today  The patient will benefit from continued PT to achieve her goals of therapy  Plan: Continue per plan of care  Progress treatment as tolerated  Precautions: NONE - L lateral shift  RE: 6/3    Specialty Daily Treatment Diary     Manual                                                 Ther Ex    TM walk        Total gym  * 15x x10 x15   Prone on elbows  Do first 10x 10x  x10 discomfort hold   Prone press ups  *2x5 then second 10x x10 p! hold   Stand lumbar extensions        Stand UBE ALT 90/70 100/70 x 8mins *100/70 x 2 mins 6 min alt 100/70 x6 min 100/ 70 x8 min   SB FWD flex and side bend   5x 20" hold stretching 5x 20" hold stretching 5x 20" hold stretching   Leg press        T-spine extensions        Nu -step S= 8 L3 x10 mins *L3 x 8 mins L3 8 min   L 3 x10 min L3 x10 min s=8   Neuro Re-Ed        Alt step taps XL x20 ea XL *4" x15 4" 15x ea 4" x15 ea 1H 4" x15 ea    Foam Balance feet together EO                       EC        bridges HEP *x10 15x x15 x15 Clam shells HEP *x15 15x 3" hold x15 x15   Tandem Stand EO        MTP/LTP/ Antirotations  *   *MTP/LTP  Red x10 ea   Standing shift correct push hips LEFT with extension 3x10       Fitter Board front/side w/stop x15 ea *  * x10 ea x15 ea   Prone on elbows hips off center to left w/ clinician O P  2x10 :10       Gait Training        Heel-toe amb 15ft x 2 *counter 10ft x 8  Cabinet 15ft x2 Cabinet 15ft x4   Hurdles Amb OBO 25ft x 2 ea fwd/side *   *25ft x2 FWD just over   Shop walk  *  *25ft x2 25ft x2 ea   6 minute walk with cues        sidestepping  * // 3 laps 25ft 25ft x2                   Ther Activity        Chair Squats x2 Low mat x10 On TG due to increased knee pain Chair x5 Chair x5   Up/down steps SOS                        Chair squats with step taps        Step ups fwd/lat  *4" x10 B/L 15x ea 4" step 4" x10 B/L ea fwd/lat 4" x10 B/L ea fwd/lat       Modalities

## 2021-06-01 ENCOUNTER — OFFICE VISIT (OUTPATIENT)
Dept: PHYSICAL THERAPY | Facility: CLINIC | Age: 71
End: 2021-06-01
Payer: MEDICARE

## 2021-06-01 DIAGNOSIS — R53.81 PHYSICAL DECONDITIONING: ICD-10-CM

## 2021-06-01 DIAGNOSIS — R53.1 WEAKNESS GENERALIZED: ICD-10-CM

## 2021-06-01 DIAGNOSIS — E66.01 CLASS 3 SEVERE OBESITY DUE TO EXCESS CALORIES WITH SERIOUS COMORBIDITY IN ADULT, UNSPECIFIED BMI (HCC): Primary | ICD-10-CM

## 2021-06-01 PROCEDURE — 97110 THERAPEUTIC EXERCISES: CPT | Performed by: PHYSICAL THERAPIST

## 2021-06-01 PROCEDURE — 97112 NEUROMUSCULAR REEDUCATION: CPT | Performed by: PHYSICAL THERAPIST

## 2021-06-01 PROCEDURE — 97116 GAIT TRAINING THERAPY: CPT | Performed by: PHYSICAL THERAPIST

## 2021-06-01 NOTE — PROGRESS NOTES
Daily Note     Today's date: 2021  Patient name: Cecy Pascal  : 1950  MRN: [id number]  Referring provider: Maynor Holloway, *  Dx:   Encounter Diagnosis     ICD-10-CM    1  Class 3 severe obesity due to excess calories with serious comorbidity in adult, unspecified BMI ([facility] [address] )  E66 01    2  Weakness generalized  R53 1    3  Physical deconditioning  R53 81                   Subjective: The patient reports feeling a 5/10 low back pain L>R this morning  The patient has been working on her side glides and extensions  She feels they are helping her posture but she is still having pain  The patient feels she needs a steroid injection  The patient reports she took a long walk and danced briefly over the weekend - both are improvements compared to 4 weeks ago  Objective: See treatment diary below      Assessment: The patient tolerated all activities well today  The patient needed verbal and manual cues for proper posture and technique to perform the exercises properly  There were no complaints of increased pain or problems after the session today  The patient will be re-assessed next visit to determine the need for continued PT  Plan: Continue per plan of care  Progress note during next visit            Precautions: NONE - L lateral shift  RE: 6/3    Specialty Daily Treatment Diary     Manual                                                 Ther Ex    TM walk        Total gym    x10 x15   Prone on elbows    x10 discomfort hold   Putty exercises/towel twists  Instructed x 3 mins      Prone press ups    x10 p! hold   Stand lumbar extensions        Stand UBE ALT 90/70 100/70 x 8mins 90/70 x 8 mins  100/70 x6 min 100/ 70 x8 min   SB FWD flex and side bend    5x 20" hold stretching 5x 20" hold stretching   Leg press        T-spine extensions        Nu -step S= 8 L3 x10 mins L3 x 10 mins  L 3 x10 min L3 x10 min s=8   Neuro Re-Ed        Alt step taps XL x20 ea XL x25 ea  4" x15 ea 1H 4" x15 ea    Foam Balance feet together EO                       EC        bridges HEP   x15 x15   Clam shells HEP   x15 x15   Tandem Stand EO        MTP/LTP/ Antirotations     *MTP/LTP  Red x10 ea   Standing shift correct push hips LEFT with extension 3x10 3x10      Fitter Board front/side w/stop x15 ea x15 ea  * x10 ea x15 ea   Prone on elbows hips off center to left w/ clinician O P  2x10 :10       Gait Training        Heel-toe amb 15ft x 2 15 ft x 2 cabinet  Cabinet 15ft x2 Cabinet 15ft x4   Hurdles Amb OBO 25ft x 2 ea fwd/side 25ft x 2 ea fwd/side   *25ft x2 FWD just over   Shop walk    *25ft x2 25ft x2 ea   6 minute walk with cues        sidestepping  reviewed  25ft 25ft x2                   Ther Activity        Chair Squats x2   Chair x5 Chair x5   Up/down steps SOS                        Chair squats with step taps        Step ups fwd/lat  Bottom step x 12 ea fwd/lat  4" x10 B/L ea fwd/lat 4" x10 B/L ea fwd/lat       Modalities                                        The patient was given a new home exercise plan with written handout, pictures, and verbal instruction  The patient accepts and understands the new home activities

## 2021-06-02 NOTE — PROGRESS NOTES
PT Re-Evaluation  and PT Discharge    Today's date: 2021  Patient name: Aubrey Agustin  : 1950  MRN: [id number]  Referring provider: Dafne White, * Dr Rey Benton  Dx:   Encounter Diagnosis     ICD-10-CM    1  Class 3 severe obesity due to excess calories with serious comorbidity in adult, unspecified BMI ([facility] [address] )  E66 01    2  Weakness generalized  R53 1    3  Physical deconditioning  R53 81                   Assessment  Assessment details: Aubrey Agustin is a 72 y o  female with a history of lupus, arthritis, DM, curvature of spine that presents for a physical therapy RE evaluation/ DISCHARGE  The patient demonstrates signs and symptoms consistent with class 3 morbid obesity with comorbidity, generalized weakness, and deconditioning  The patient attended 9 sessions of skilled PT  During the examination the patient demonstrated improved core/L UE/LE strength, improved endurance, decreased gait dysfunction, improved balance, improved but impaired posture, and low back pain  The patient walked 213 feet further than her initial test distance during the six minute walk test   The patient has made functional gains since starting therapy  The patient is now able to open jars, lift/carry files, and walk without her SPC  The patient continues to have difficulty with prolonged walking and walking on unlevel surfaces  The patient will be discharged from formal PT to an independent HEP per patient request     Understanding of Dx/Px/POC: fair   Prognosis: fair    Goals  STG: achieve in 4-6 weeks  1  LBP pain less than 2/10/10 at worst to allow improved activity tolerance  Partially met  2  Patient's L LE and core strength improve by 1/2-1 muscle grade to improve ambulation distance  MET  3  30 second sit to stand score improve by 5 stands to a total of 12 to indicate improved transfers  MET       LTG:  Achieve in 6-12 weeks  1    Patient's PSFS AVG score less than 3 0/10 to indicate a return of normal function/strength  Partially met  2  Patient's tandem stance balance improve to >20 seconds without LOB B/L LE to indicate normal balance  Partially met  3  Patient walk > 10 mins, lift/carry objects, and open jars without difficulty to achieve their personal goal   Partially met          Plan  Plan details: D/C PT TO AN INDEPENDENT HEP per patient request  Treatment plan discussed with: PTA and patient        Subjective Evaluation    History of Present Illness  Date of onset: 4/11/2021  Mechanism of injury: SUBJECTIVE: 6/4/2021:The patient feels she is walking further but still limited  She doesn't feel like she could walk for exercise at this time  The patient notes an improvement with her balance as she is walking without needing to use her SPC  The patient is lifting/carrying files at work and able to open jars at home with less difficulty  INJURY HISTORY: Nicol Kay is a 72 y o  female that presents to outpatient physical therapy with complaints of weakness and deconditioned  The patient reports recent hospitalization for pneumonia 4/11-4/13 which caused increased weakness/deconditioning  The patient followed up with her PCP 1 week after the hospitalization and the patient was referred to outpatient PT  The patient complains of difficulty prolonged walking unsupported ( > 5 mins), difficulty lifting/carrying objects, and difficulty opening jars  The patient's main goal for physical therapy is to walk longer, lift, and open jars with less difficulty      Patient notes chronic back pain being treated by pain management with steroid injections 1x/ 3 months       Pain  Current pain rating: 3  At best pain rating: 3  At worst pain rating: 3  Location: lumbar spine  Quality: dull ache  Relieving factors: medications  Aggravating factors: walking, standing and lifting  Progression: improved    Social Support  Steps to enter house: yes  Stairs in house: yes   Lives in: multiple-level home  Lives with: adult children and young children    Employment status: working ( part time)  Hand dominance: right    Patient Goals  Patient goal: Walk longer distances, lift without difficulty, and open jars ( PARTIALLY MET)        Objective     Static Posture     Shoulders  Asymmetric shoulders  Lumbar Spine   Flattened  Lumbar spine (Left): Shifted  Neurological Testing     Sensation   Cervical/Thoracic   Left   Intact: light touch  Paresthesia: light touch    Right   Intact: light touch    Comments   Left light touch: left 5th digit  -constant       Lumbar   Left   Intact: light touch    Right   Intact: light touch    Strength/Myotome Testing   Cervical Spine     Right   Interossei strength (t1): 3+    Left Shoulder     Planes of Motion   Flexion: 4+   Extension: 4+   Abduction: 4   External rotation at 0°: 4   Internal rotation at 0°: 4     Right Shoulder     Planes of Motion   Flexion: 4   Extension: 4   Abduction: 4   External rotation at 0°: 4   Internal rotation at 0°: 4     Left Elbow   Flexion: 4+  Extension: 4+    Right Elbow   Flexion: 4  Extension: 4    Left Wrist/Hand   Wrist extension: 4+  Wrist flexion: 4+  Thumb extension: 4    Right Wrist/Hand   Wrist extension: 4  Wrist flexion: 4  Thumb extension: 4    Left Hip   Planes of Motion   Flexion: 4  Extension: 4-  Abduction: 4+  Adduction: 4+    Right Hip   Planes of Motion   Flexion: 4+  Extension: 4+  Abduction: 4+  Adduction: 4+    Left Knee   Flexion: 4+  Extension: 4+    Right Knee   Flexion: 4  Extension: 4-    Left Ankle/Foot   Dorsiflexion: 5  Plantar flexion: 5    Right Ankle/Foot   Dorsiflexion: 5  Plantar flexion: 5    Additional Strength Details  IMPROVED    Muscle Activation     Additional Muscle Activation Details  IMPROVED TrA, multifidus, gluteal activation with single leg activities / Transfers      Tests     Additional Tests Details  30 SSTS: 8 stands no hands ( improved 2 stands)  PSFS: AVG SCORE 4 66/10 ( improved 2 33)    TU seconds    6 minute walk test: 2 rest breaks and improved 213 feet  Total 807 feet    Gait impairments: Patient ambulates with no AD WBAT B/L LE  The patient demonstrates improved gait speed, more step lengths, externally rotated L hip, and decreased heel-toe rollover   Improved posture awareness                         Precautions: NONE  RE: 6/3  Specialty Daily Treatment Diary     Manual                                                 Ther Ex    TM walk        Total gym     x15   Prone on elbows     hold   Putty exercises/towel twists  Instructed x 3 mins reviewed     Prone press ups     hold   Stand lumbar extensions        Stand UBE ALT 90/70 100/70 x 8mins 90/70 x 8 mins 90/70 x 8 mins  100/ 70 x8 min   SB FWD flex and side bend     5x 20" hold stretching   Leg press        T-spine extensions        Nu -step S= 8 L3 x10 mins L3 x 10 mins L3 x 10 mins  L3 x10 min s=8   Neuro Re-Ed        Alt step taps XL x20 ea XL x25 ea   4" x15 ea    Foam Balance feet together EO                       EC        bridges HEP    x15   Clam shells HEP    x15   Tandem Stand EO        MTP/LTP/ Antirotations     *MTP/LTP  Red x10 ea   Standing shift correct push hips LEFT with extension 3x10 3x10 x10     Fitter Board front/side w/stop x15 ea x15 ea   x15 ea   Prone on elbows hips off center to left w/ clinician O P  2x10 :10       Gait Training        Heel-toe amb 15ft x 2 15 ft x 2 cabinet reviewed  Cabinet 15ft x4   Hurdles Amb OBO 25ft x 2 ea fwd/side 25ft x 2 ea fwd/side   *25ft x2 FWD just over   Shop walk     25ft x2 ea   6 minute walk with cues   Done 12 laps + 15 ft and 2 rest breaks     sidestepping  reviewed   25ft x2                   Ther Activity        Chair Squats x2  x8 0H  Chair x5   Up/down steps SOS                        Chair squats with step taps        Step ups fwd/lat  Bottom step x 12 ea fwd/lat   4" x10 B/L ea fwd/lat       Modalities

## 2021-06-04 ENCOUNTER — EVALUATION (OUTPATIENT)
Dept: PHYSICAL THERAPY | Facility: CLINIC | Age: 71
End: 2021-06-04
Payer: MEDICARE

## 2021-06-04 DIAGNOSIS — R53.81 PHYSICAL DECONDITIONING: ICD-10-CM

## 2021-06-04 DIAGNOSIS — E66.01 CLASS 3 SEVERE OBESITY DUE TO EXCESS CALORIES WITH SERIOUS COMORBIDITY IN ADULT, UNSPECIFIED BMI (HCC): Primary | ICD-10-CM

## 2021-06-04 DIAGNOSIS — R53.1 WEAKNESS GENERALIZED: ICD-10-CM

## 2021-06-04 PROCEDURE — 97116 GAIT TRAINING THERAPY: CPT | Performed by: PHYSICAL THERAPIST

## 2021-06-04 PROCEDURE — 97110 THERAPEUTIC EXERCISES: CPT | Performed by: PHYSICAL THERAPIST

## 2021-07-20 ENCOUNTER — HOSPITAL ENCOUNTER (OUTPATIENT)
Dept: CT IMAGING | Facility: HOSPITAL | Age: 71
Discharge: HOME | End: 2021-07-20
Payer: MEDICARE

## 2021-07-20 DIAGNOSIS — J18.8 OTHER PNEUMONIA, UNSPECIFIED ORGANISM: ICD-10-CM

## 2021-07-20 PROCEDURE — 71260 CT THORAX DX C+: CPT

## 2021-07-20 RX ADMIN — IOHEXOL 85 ML: 350 INJECTION, SOLUTION INTRAVENOUS at 14:50

## 2022-10-12 PROBLEM — A41.9 SEPSIS (HCC): Status: RESOLVED | Noted: 2021-04-11 | Resolved: 2022-10-12

## 2023-02-14 NOTE — PROGRESS NOTES
PT Evaluation     Today's date: 2/15/2023  Patient name: Judi Kwong  : 1950  MRN: [id number]  Referring provider: Michelle Marques MD  Dx:   Encounter Diagnosis     ICD-10-CM    1  Chronic midline low back pain with sciatica, sciatica laterality unspecified  M54 40     G89 29       2  Acute midline thoracic back pain  M54 6           Start Time: 1100  Stop Time: 1200  Total time in clinic (min): 60 minutes    Assessment  Assessment details: Patient is a 71 y o  female who reports to outpatient physical therapy with acute thoracic back pain, chronic low back pain  Probable movement impairment diagnosis of lumbar instability resulting in pathoanatomical symptoms of thoracic/lumbar pain, decreased ROM, decreased strength, poor posture, decreased gait tolerance/endurance/posture and limiting ability to complete normal daily activity without pain  Skilled physical therapy is warranted for the application of the interventions found below in the planned intervention section  MDT Provisional Classification: Other: scoliosis/osteopenia   Directional Preference: None    Potential Drivers of Pain and/or Disability: Comorbidities: curvature/osteopenia    Principals of Management:   Education: lumbar roll  Exercise Type: LEODAN  Exercise Frequency: 3x daily  Other Exercises/Interventions: lumbar roll    Etiologic factors include chronic pain/posture  Prognosis is good given HEP compliance and attendance to physical therapy 2x a week for 8 weeks  Positive prognostic indicators include low irritability on IE  Negative prognostic indicators include chronic pain  Please contact me if you have any questions or recommendations  Thank you for the referral and the opportunity to share in [facility]'s care  Patient verbalized understanding of POC, HEP, and return demonstrated HEP    Impairments: abnormal gait, abnormal or restricted ROM, activity intolerance, impaired balance, impaired physical strength, lacks appropriate home exercise program, pain with function, weight-bearing intolerance and poor posture     Goals  STG (4 weeks)  1  Patient will have reported <2/10 pain in back at rest    2  Improve patient's thoracolumbar AROM to WNL degrees for increased ability to take proper strides during ambulation  3  Increase patient's tandem balance to >30 seconds for increased stability on stairs  4  Decrease TUG time to <14" for decreased risk of falls  LTG (8 weeks)  1  Patient's LE strength will be equal bilaterally for ability to ambulate and return to functional activities at PLOF  2  Patient will be able to perform 5x STS with 0/10 pain in back in under 15"  3  Patient will be independent with home exercise program for continued maintenance post PT discharge  Plan  Plan details: Extension based exercise  Postural education  Lumbar Roll  Lumbar stabilization/core strengthening  Balance  Planned therapy interventions: manual therapy, neuromuscular re-education, patient education, therapeutic activities, therapeutic exercise, home exercise program, gait training, balance and postural training  Frequency: 2x week  Duration in weeks: 12  Plan of Care beginning date: 2/15/2023  Plan of Care expiration date: 5/15/2023  Treatment plan discussed with: patient        Subjective Evaluation    History of Present Illness  Mechanism of injury: Kaylee Castellano presents to outpatient physical therapy with a referral for low back and thoracic back pain  Age: 71 y o  Referral: GP/PCP    Leisure Activities: reading/craftwork   Functional Limitation for Present Episode: lifting/carrying/housework/vaccuming (poor endurance)/stairs/standing doing crafts  NPRS: 9  Other: asks about black and blue L foot    Symptoms:   Pain Map/Pain Location: Chronic Low back pain; middle of back pain for the last couple of months  Pain is in middle of back and will go around sides  Used to get numbness/tingling into legs     Present Symptoms: middle of back wrapping around; some low back pain into posterior L LE not past knee recently     Symptoms Present Since: chronic low back pain; acute thoracic pain  Symptom Progression: Unchanging  Symptoms Commenced as a Result of: insidious onset  Symptoms at Onset: Back (middle)  Constant Symptoms: Back (middle > low)  Intermittent Symptoms: Low back, Thigh  Worse with: rising, walking, as the day progresses and pm, lifting, carrying, housework   Better With: sitting, resting    Specific Questions:   Disturbed Sleep: yes with new middle of back pain; can get back to sleep  Sleeping Postures: supine, Surface:adjustable bed/recliner  Previous Spinal History: chronic lumbar pain; no history of surgery   Previous Treatments: injections, rhizotomy, PT   Cough/Sneeze/Strain: feels like she has to brace herself; pain with deep breath   Bladder/Bowel: normal  Gait: normal; (curve pushes her front when she walks) uses SPC on R side   Medications: PRN tramadol; gabapentin  General Health/Comorbidities: osteoporosis, knees are bad, DM  Recent/Relevant Surgery: n/a  History of Cancer: yes skin basal cell   Unexplained Weight Loss: no  History of Trauma: no  Imaging: yes; xray  Patient Goals/Expectations: get rid of pain, improve functional activity, improve strength, improve walking         Pain  Current pain rating: 3    Social Support  Steps to enter house: yes  Stairs in house: yes (walls no rail)   Lives in: multiple-level home  Lives with: adult children    Employment status: not working        Objective     General Comments:      Lumbar Comments  POSTURAL OBSERVATION:   Sitting: kyphotic; curvature   Change of Posture: worse with upright sitting  Standing: kyphotic, knees bent  Lateral Shift: Left; scoliotic  Shift Relevant: no  Other Observations/Functional Baselines: scoliosis    NEUROLOGICAL:     REFLEXES: WNL    SENSORY Deficit: WNL    MOTOR Deficit:   Left:   Hip Flexion  (L2): 5/5   Knee Ext (L3): 5/5  Ankle DF (L4): 5/5  [facility] Toe Ext (L5): 5/5  Ankle PF (S1): 3+/5  Right:   Hip Flexion  (L2): 5/5   Knee Ext (L3): 5/5  Ankle DF (L4): 5/5  Great Toe Ext (L5): 5/5  Ankle PF (S1): 3+/5    NEURODYNAMIC TESTS:   Slump Test: -  SLR Test: np    MOVEMENT LOSS:   Symptoms:   Flexion:  Minimum none  Extension:  Moderate buttock  Side Glide R:  Minimum tigh  Side Glide L:  Moderate Pain in middle back  Other:     TEST MOVEMENTS: (During/After/Mechanical Response)  Pretest Standing:   FIS:  no effect  RFIS:  no effect/no effect/  EIS:  centralising  LEODAN:  centralising/better/       Other Movements:     STATIC TESTS: np  Other Tests:     Tu"  5x STS: 16"  FT EO: >30" unsteady  FT EC: 25"  Tandem: 15"  SLS: unable BL               Precautions: osteoporosis/osteopenia, DM, fall risk  HEP: LEODAN at sink + lumbar roll (2/15)  RE: (3/15)  POC: (5/15)  FOTO: 47 (2/15)    Manuals 2/15                                                                Neuro Re-Ed             Core brace             Static Balance                                                                              Ther Ex             Towel roll edu TC            LEODAN at sink 2x10                                                                                                       Ther Activity                                       Gait Training                                       Modalities

## 2023-02-15 ENCOUNTER — EVALUATION (OUTPATIENT)
Dept: PHYSICAL THERAPY | Facility: CLINIC | Age: 73
End: 2023-02-15

## 2023-02-15 DIAGNOSIS — M54.40 CHRONIC MIDLINE LOW BACK PAIN WITH SCIATICA, SCIATICA LATERALITY UNSPECIFIED: Primary | ICD-10-CM

## 2023-02-15 DIAGNOSIS — M54.6 ACUTE MIDLINE THORACIC BACK PAIN: ICD-10-CM

## 2023-02-15 DIAGNOSIS — G89.29 CHRONIC MIDLINE LOW BACK PAIN WITH SCIATICA, SCIATICA LATERALITY UNSPECIFIED: Primary | ICD-10-CM

## 2023-02-15 NOTE — LETTER
2023    [place]  [id number]    Patient: Rae Jones   YOB: 1950   Date of Visit: 2/15/2023     Encounter Diagnosis     ICD-10-CM    1  Chronic midline low back pain with sciatica, sciatica laterality unspecified  M54 40     G89 29       2  Acute midline thoracic back pain  M54 6           Dear Dr Shreya Luna: Thank you for your recent referral of Rae Jones  Please review the attached evaluation summary from 's recent visit  Please verify that you agree with the plan of care by signing the attached order  If you have any questions or concerns, please do not hesitate to call  I sincerely appreciate the opportunity to share in the care of one of your patients and hope to have another opportunity to work with you in the near future  Sincerely,    Reyes Iha, PT      Referring Provider:      I certify that I have read the below Plan of Care and certify the need for these services furnished under this plan of treatment while [place]  [id number]  [employer]  Via Fax: [phone]          PT Evaluation     Today's date: 2/15/2023  Patient name: Rae Jones  : 1950  MRN: [id number]  Referring provider: Ainsley Brannon MD  Dx:   Encounter Diagnosis     ICD-10-CM    1  Chronic midline low back pain with sciatica, sciatica laterality unspecified  M54 40     G89 29       2  Acute midline thoracic back pain  M54 6           Start Time: 1100  Stop Time: 1200  Total time in clinic (min): 60 minutes    Assessment  Assessment details: Patient is a 71 y o  female who reports to outpatient physical therapy with acute thoracic back pain, chronic low back pain   Probable movement impairment diagnosis of lumbar instability resulting in pathoanatomical symptoms of thoracic/lumbar pain, decreased ROM, decreased strength, poor posture, decreased gait tolerance/endurance/posture and limiting ability to complete normal daily activity without pain  Skilled physical therapy is warranted for the application of the interventions found below in the planned intervention section  MDT Provisional Classification: Other: scoliosis/osteopenia   Directional Preference: None    Potential Drivers of Pain and/or Disability: Comorbidities: curvature/osteopenia    Principals of Management:   Education: lumbar roll  Exercise Type: LEODAN  Exercise Frequency: 3x daily  Other Exercises/Interventions: lumbar roll    Etiologic factors include chronic pain/posture  Prognosis is good given HEP compliance and attendance to physical therapy 2x a week for 8 weeks  Positive prognostic indicators include low irritability on IE  Negative prognostic indicators include chronic pain  Please contact me if you have any questions or recommendations  Thank you for the referral and the opportunity to share in [facility]'s care  Patient verbalized understanding of POC, HEP, and return demonstrated HEP  Impairments: abnormal gait, abnormal or restricted ROM, activity intolerance, impaired balance, impaired physical strength, lacks appropriate home exercise program, pain with function, weight-bearing intolerance and poor posture     Goals  STG (4 weeks)  1  Patient will have reported <2/10 pain in back at rest    2  Improve patient's thoracolumbar AROM to WNL degrees for increased ability to take proper strides during ambulation  3  Increase patient's tandem balance to >30 seconds for increased stability on stairs  4  Decrease TUG time to <14" for decreased risk of falls  LTG (8 weeks)  1  Patient's LE strength will be equal bilaterally for ability to ambulate and return to functional activities at PLOF  2  Patient will be able to perform 5x STS with 0/10 pain in back in under 15"  3  Patient will be independent with home exercise program for continued maintenance post PT discharge  Plan  Plan details: Extension based exercise  Postural education  Lumbar Roll  Lumbar stabilization/core strengthening  Balance  Planned therapy interventions: manual therapy, neuromuscular re-education, patient education, therapeutic activities, therapeutic exercise, home exercise program, gait training, balance and postural training  Frequency: 2x week  Duration in weeks: 12  Plan of Care beginning date: 2/15/2023  Plan of Care expiration date: 5/15/2023  Treatment plan discussed with: patient        Subjective Evaluation    History of Present Illness  Mechanism of injury: Jabari Martin presents to outpatient physical therapy with a referral for low back and thoracic back pain  Age: 71 y o  Referral: GP/PCP    Leisure Activities: reading/craftwork   Functional Limitation for Present Episode: lifting/carrying/housework/vaccuming (poor endurance)/stairs/standing doing crafts  NPRS: 9  Other: asks about black and blue L foot    Symptoms:   Pain Map/Pain Location: Chronic Low back pain; middle of back pain for the last couple of months  Pain is in middle of back and will go around sides  Used to get numbness/tingling into legs  Present Symptoms: middle of back wrapping around; some low back pain into posterior L LE not past knee recently     Symptoms Present Since: chronic low back pain; acute thoracic pain  Symptom Progression: Unchanging  Symptoms Commenced as a Result of: insidious onset  Symptoms at Onset: Back (middle)  Constant Symptoms: Back (middle > low)  Intermittent Symptoms: Low back, Thigh  Worse with: rising, walking, as the day progresses and pm, lifting, carrying, housework   Better With: sitting, resting    Specific Questions:   Disturbed Sleep: yes with new middle of back pain; can get back to sleep  Sleeping Postures: supine, Surface:adjustable bed/recliner  Previous Spinal History: chronic lumbar pain; no history of surgery   Previous Treatments: injections, rhizotomy, PT   Cough/Sneeze/Strain: feels like she has to brace herself; pain with deep breath   Bladder/Bowel: normal  Gait: normal; (curve pushes her front when she walks) uses SPC on R side   Medications: PRN tramadol; gabapentin  General Health/Comorbidities: osteoporosis, knees are bad, DM  Recent/Relevant Surgery: n/a  History of Cancer: yes skin basal cell   Unexplained Weight Loss: no  History of Trauma: no  Imaging: yes; xray  Patient Goals/Expectations: get rid of pain, improve functional activity, improve strength, improve walking         Pain  Current pain rating: 3    Social Support  Steps to enter house: yes  Stairs in house: yes (walls no rail)   Lives in: multiple-level home  Lives with: adult children    Employment status: not working        Objective     General Comments:      Lumbar Comments  POSTURAL OBSERVATION:   Sitting: kyphotic; curvature   Change of Posture: worse with upright sitting  Standing: kyphotic, knees bent  Lateral Shift: Left; scoliotic  Shift Relevant: no  Other Observations/Functional Baselines: scoliosis    NEUROLOGICAL:     REFLEXES: WNL    SENSORY Deficit: WNL    MOTOR Deficit:   Left:   Hip Flexion  (L2): 5/5   Knee Ext (L3): 5/5  Ankle DF (L4): 5/5  Great Toe Ext (L5): 5/5  Ankle PF (S1): 3+/5  Right:   Hip Flexion  (L2): 5/5   Knee Ext (L3): 5/5  Ankle DF (L4): 5/5  Great Toe Ext (L5): 5/5  Ankle PF (S1): 3+/5    NEURODYNAMIC TESTS:   Slump Test: -  SLR Test: np    MOVEMENT LOSS:   Symptoms:   Flexion:  Minimum none  Extension:  Moderate buttock  Side Glide R:  Minimum tigh  Side Glide L:  Moderate Pain in middle back  Other:     TEST MOVEMENTS: (During/After/Mechanical Response)  Pretest Standing:   FIS:  no effect  RFIS:  no effect/no effect/  EIS:  centralising  LEODAN:  centralising/better/       Other Movements:     STATIC TESTS: np  Other Tests:     Tu"  5x STS: 16"  FT EO: >30" unsteady  FT EC: 25"  Tandem: 15"  SLS: unable BL              Precautions: osteoporosis/osteopenia, DM, fall risk  HEP: LEODAN at sink + lumbar roll (2/15)  RE: (3/15)  POC: (5/15)  FOTO: 47 (2/15)    Manuals 2/15                                                                Neuro Re-Ed             Core brace             Static Balance                                                                              Ther Ex             Towel roll edu TC            ELODAN at sink 2x10                                                                                                       Ther Activity                                       Gait Training                                       Modalities

## 2023-02-16 ENCOUNTER — TELEPHONE (OUTPATIENT)
Dept: PHYSICAL THERAPY | Facility: CLINIC | Age: 73
End: 2023-02-16

## 2023-02-17 NOTE — PROGRESS NOTES
Daily Note     Today's date: 2023  Patient name: Camille Garcia  : 1950  MRN: [id number]  Referring provider: Miladis Patterson MD  Dx:   Encounter Diagnosis     ICD-10-CM    1  Chronic midline low back pain with sciatica, sciatica laterality unspecified  M54 40     G89 29       2  Acute midline thoracic back pain  M54 6           Start Time: 1031  Stop Time: 1115  Total time in clinic (min): 44 minutes    Subjective: Has been performing her LEODAN at sink exercise a few times per day  Has used towel roll in sitting, it feels good  Objective: See treatment diary below  2MWT: 4 laps (2 down and back)= 264'    Assessment: Tolerated treatment well, began treatment with 2MWT assessment  Loss of posture after walking, increased forward lean and increased L lateral lean and R hip pain  Needed cues for proper technique with LEODAN, patient was holding stretch, encouraged to perform repeated motion rather than static hold  Patient tolerated RSIS at wall well, no pain after, able to walk without pain  Initiated core strengthening/stabilization, patient tolerated well, able to grasp core brace motion/contraction  Patient demonstrated fatigue post treatment and would benefit from continued PT  HEP updated/printed/reviewed with patient  Plan: Continue per plan of care         Precautions: osteoporosis/osteopenia, DM, fall risk  HEP:  (2/15)3BTPRJWR   RE: (3/15)  POC: (5/15)  FOTO: 47 (2/15)    Manuals 2/15 2/20                                                               Neuro Re-Ed             Core brace  Brace + March  :05x15  x10 alt           Hip add ball  :05x15           Static Balance  FT EO  FT EC  Mod tandem  Squats  csmi  :30  2x :15  2x :30 ea  x10                                                                            Ther Ex             Towel roll edu TC reviewed           LEODAN at sink 2x10 2x10           SGIS at wall R  2x10 Ther Activity             2 MWT  done                        Gait Training                                       Modalities

## 2023-02-20 ENCOUNTER — OFFICE VISIT (OUTPATIENT)
Dept: PHYSICAL THERAPY | Facility: CLINIC | Age: 73
End: 2023-02-20

## 2023-02-20 DIAGNOSIS — M54.40 CHRONIC MIDLINE LOW BACK PAIN WITH SCIATICA, SCIATICA LATERALITY UNSPECIFIED: Primary | ICD-10-CM

## 2023-02-20 DIAGNOSIS — G89.29 CHRONIC MIDLINE LOW BACK PAIN WITH SCIATICA, SCIATICA LATERALITY UNSPECIFIED: Primary | ICD-10-CM

## 2023-02-20 DIAGNOSIS — M54.6 ACUTE MIDLINE THORACIC BACK PAIN: ICD-10-CM

## 2023-02-21 NOTE — PROGRESS NOTES
Daily Note     Today's date: 2023  Patient name: Beba Marte  : 1950  MRN: [id number]  Referring provider: Vanessa Caba MD  Dx:   Encounter Diagnosis     ICD-10-CM    1  Chronic midline low back pain with sciatica, sciatica laterality unspecified  M54 40     G89 29       2  Acute midline thoracic back pain  M54 6           Start Time: 0900  Stop Time: 0940  Total time in clinic (min): 40 minutes    Subjective: Felt good after last visit  Had pain in L thoracic area of her new pain spot yesterday, not relieved with back bends, some better today  Objective: See treatment diary below    Assessment: Tolerated treatment fair, active warmup with hallway walking for endurance with cues for posture; patient demonstrated less fatigue and less loss of posture throughout walk compared to Monday  Performed exercises in chair with lumbar roll support today in place of mat exercise  Demonstrated good tolerance to seated exercise and exercise progression working on scapular retraction strengthening  Patient demonstrated fatigue post treatment and would benefit from continued PT  Instructed to try adding scapular retraction exercise to HEP  Plan: Continue per plan of care  Assess response to scapular retraction as part of HPE        Precautions: osteoporosis/osteopenia, DM, fall risk  HEP:  (2/15)3BTPRJWR   RE: (3/15)  POC: (5/15)  FOTO: 47 (2/15)    Manuals 2/15 2/20 2/22                                                              Neuro Re-Ed             Core brace  Brace + March  :05x15  x10 alt :05x10 sit  x10 alt sit          Hip add ball  :05x15 :05x15 sit Yel SB          Static Balance  FT EO  FT EC  Mod tandem  Squats  csmi  :30  2x :15  2x :30 ea  x10 csmi foam  2x:30  2x:30  :30 ea  x6 knee p!          scap 1, 3   Seated red x20ea          scap retraction   x20 at wall                                                 Ther Ex             Towel roll edu TC reviewed practiced          LEODAN at sink 2x10 2x10 2x10          SGIS at wall R  2x10 2x10                                                                                        Ther Activity             2 MWT  done           Hallway walk   4' w/u          Gait Training                                       Modalities

## 2023-02-22 ENCOUNTER — OFFICE VISIT (OUTPATIENT)
Dept: PHYSICAL THERAPY | Facility: CLINIC | Age: 73
End: 2023-02-22

## 2023-02-22 DIAGNOSIS — M54.40 CHRONIC MIDLINE LOW BACK PAIN WITH SCIATICA, SCIATICA LATERALITY UNSPECIFIED: Primary | ICD-10-CM

## 2023-02-22 DIAGNOSIS — G89.29 CHRONIC MIDLINE LOW BACK PAIN WITH SCIATICA, SCIATICA LATERALITY UNSPECIFIED: Primary | ICD-10-CM

## 2023-02-22 DIAGNOSIS — M54.6 ACUTE MIDLINE THORACIC BACK PAIN: ICD-10-CM

## 2023-02-27 ENCOUNTER — OFFICE VISIT (OUTPATIENT)
Dept: PHYSICAL THERAPY | Facility: CLINIC | Age: 73
End: 2023-02-27

## 2023-02-27 DIAGNOSIS — G89.29 CHRONIC MIDLINE LOW BACK PAIN WITH SCIATICA, SCIATICA LATERALITY UNSPECIFIED: Primary | ICD-10-CM

## 2023-02-27 DIAGNOSIS — M54.40 CHRONIC MIDLINE LOW BACK PAIN WITH SCIATICA, SCIATICA LATERALITY UNSPECIFIED: Primary | ICD-10-CM

## 2023-02-27 DIAGNOSIS — M54.6 ACUTE MIDLINE THORACIC BACK PAIN: ICD-10-CM

## 2023-02-27 NOTE — PROGRESS NOTES
Daily Note     Today's date: 2023  Patient name: Baylee Gupta  : 1950  MRN: [id number]  Referring provider: Jose Wright MD  Dx:   Encounter Diagnosis     ICD-10-CM    1  Chronic midline low back pain with sciatica, sciatica laterality unspecified  M54 40     G89 29       2  Acute midline thoracic back pain  M54 6                      Subjective: Pt reports that she had some increased pain in her low back yesterday noting that she was laying on her side which causes increased pain  Notes that she has been doing her HEP  Objective: See treatment diary below    Assessment: Tolerated treatment fair, continuing with active hallway walk warmup  Seated posture and scap strengthening is improving with main focus on that today  Lumbar roll used again for support with sitting  Patient demonstrated fatigue post treatment and would benefit from continued PT  Plan: Continue per plan of care  Assess response to scapular retraction as part of HPE        Precautions: osteoporosis/osteopenia, DM, fall risk  HEP:  (2/15)3BTPRJWR   RE: (3/15)  POC: (5/15)  FOTO: 47 (2/15)    Manuals 2/15 2/20 2/22 2/27                                                             Neuro Re-Ed             Core brace  Brace + March  :05x15  x10 alt :05x10 sit  x10 alt sit :05x10 sit  x10 alt sit         Hip add ball  :05x15 :05x15 sit Yel SB :05x15 sit Yel SB         Static Balance  FT EO  FT EC  Mod tandem  Squats  csmi  :30  2x :15  2x :30 ea  x10 csmi foam  2x:30  2x:30  :30 ea  x6 knee p! csmi foam  2x:30  2x:30  :30 ea  x10         scap 1, 3   Seated red x20ea Seated red x20ea         scap retraction   x20 at wall x20 at wall                                                Ther Ex             Towel roll edu TC reviewed practiced HEP         LEODAN at sink 2x10 2x10 2x10 2x10         SGIS at wall R  2x10 2x10 2x10                                                                                       Ther Activity             2 MWT  done           Hallway walk   4' w/u 4' w/u         Gait Training                                       Modalities

## 2023-03-01 NOTE — PROGRESS NOTES
Daily Note     Today's date: 3/2/2023  Patient name: Magali Shaw  : 1950  MRN: [id number]  Referring provider: Antwan Anderson, *  Dx:   Encounter Diagnosis     ICD-10-CM    1  Chronic midline low back pain with sciatica, sciatica laterality unspecified  M54 40     G89 29       2  Acute midline thoracic back pain  M54 6           Start Time: 0900  Stop Time: 0945  Total time in clinic (min): 45 minutes    Subjective: Saw ortho yesterday, MD instructed patient not to bend backwards for back pain but to bend forward  Patient has increased low back pain this week  MD to start patient on muscle relaxer  Objective: See treatment diary below     Assessment: Tolerated treatment fair, active warm up in hallway  R hip started to hurt after walking; performed scap retraction at wall in place of lumbar extension  Tolerated progression of anti rotation exercise for lumbar stabilization well without pain  Improved technique with side glide at wall, increased ROM to Left  Added QL stretch for relief of tightness of L sided thoracic pain  Patient demonstrated fatigue post treatment and would benefit from continued PT  Plan: Continue per plan of care  To see PCP tomorrow  Progress stabilization as tolerated        Precautions: osteoporosis/osteopenia, DM, fall risk  HEP:  (2/15)3BTPRJWR   RE: (3/15)  POC: (5/15)  FOTO: 47 (2/15)    Manuals 2/15 2/20 2/22 2/27 3/2                                                            Neuro Re-Ed             Core brace  Brace + March  :05x15  x10 alt :05x10 sit  x10 alt sit :05x10 sit  x10 alt sit :90 sit  :90 sit        Hip add ball  :05x15 :05x15 sit Yel SB :05x15 sit Yel SB :05x15 sit Yel SB        Static Balance  FT EO  FT EC  Mod tandem  Squats  csmi  :30  2x :15  2x :30 ea  x10 csmi foam  2x:30  2x:30  :30 ea  x6 knee p! csmi foam  2x:30  2x:30  :30 ea  x10 csmi foam  :60  :60  :30 ea  x10        scap 1, 3   Seated red x20ea Seated red x20ea Seated GRN x20 scap retraction   x20 at wall x20 at wall x20 at wall        Anti rot press     Seated GRN x15                                  Ther Ex             Towel roll edu TC reviewed practiced HEP seated        LEODAN at sink 2x10 2x10 2x10 2x10 Hold        SGIS at wall R  2x10 2x10 2x10 x15        Seated QL stretch L     :05 2x15                                                                         Ther Activity             2 MWT  done           Hallway walk   4' w/u 4' w/u 5' w/u        Gait Training                                       Modalities

## 2023-03-02 ENCOUNTER — OFFICE VISIT (OUTPATIENT)
Dept: PHYSICAL THERAPY | Facility: CLINIC | Age: 73
End: 2023-03-02

## 2023-03-02 DIAGNOSIS — G89.29 CHRONIC MIDLINE LOW BACK PAIN WITH SCIATICA, SCIATICA LATERALITY UNSPECIFIED: Primary | ICD-10-CM

## 2023-03-02 DIAGNOSIS — M54.6 ACUTE MIDLINE THORACIC BACK PAIN: ICD-10-CM

## 2023-03-02 DIAGNOSIS — M54.40 CHRONIC MIDLINE LOW BACK PAIN WITH SCIATICA, SCIATICA LATERALITY UNSPECIFIED: Primary | ICD-10-CM

## 2023-03-06 ENCOUNTER — OFFICE VISIT (OUTPATIENT)
Dept: PHYSICAL THERAPY | Facility: CLINIC | Age: 73
End: 2023-03-06

## 2023-03-06 DIAGNOSIS — M54.6 ACUTE MIDLINE THORACIC BACK PAIN: Primary | ICD-10-CM

## 2023-03-06 NOTE — PROGRESS NOTES
Daily Note     Today's date: 3/6/2023  Patient name: Joaquina Osei  : 1950  MRN: [id number]  Referring provider: Hermilo Ledbetter, *  Dx:   Encounter Diagnosis     ICD-10-CM    1  Acute midline thoracic back pain  M54 6           Start Time: 945  Stop Time: 1030  Total time in clinic (min): 45 minutes    Subjective: MD does not want patient bending backwards and performing lumbar extension  Agreed that strengthening of core is helpful  Has had some low back and nerve pain  Getting up from chair has been painful  Objective: See treatment diary below    Assessment: Tolerated treatment well, active warm up on UBE for stabilization  Progressed abdominal activation/core strengthening well today without any increase in pain  Some improvement in static balance today  Good technique with HEP exercises  Squatting continues to be limited by L knee pain/weakness  Continues to respond well to use of towel roll for positioning/posture  Patient demonstrated fatigue post treatment and would benefit from continued PT  TB given to patient to perform seated rows at home as part of HEP  Plan: Continue per plan of care  Progress core strengthening as tolerated        Precautions: osteoporosis/osteopenia, DM, fall risk  HEP:  (2/15)3BTPRJWR   RE: (3/15)  POC: (5/15)  FOTO: 47 (2/15)    Manuals 2/15 2/20 2/22 2/27 3/2 3/6                                                           Neuro Re-Ed             Stand UBE foam reverse      6 min       Core brace  Brace + March  Brace + OH UE BL Flex  :05x15  x10 alt :05x10 sit  x10 alt sit :05x10 sit  x10 alt sit :90 sit  :90 sit :90 sit  :90 sit  :90 sit       Hip add ball  :05x15 :05x15 sit Yel SB :05x15 sit Yel SB :05x15 sit Yel SB :05x15 sit Yel SB       Static Balance  FT EO  FT EC  Mod tandem  Squats  csmi  :30  2x :15  2x :30 ea  x10 csmi foam  2x:30  2x:30  :30 ea  x6 knee p! csmi foam  2x:30  2x:30  :30 ea  x10 csmi foam  :60  :60  :30 ea  x10 csmi foam  :60   2x :60  :60 ea  x10       scap 1, 3   Seated red x20ea Seated red x20ea Seated GRN x20 Seated GRN x20       scap retraction   x20 at wall x20 at wall x20 at wall x20 at wall       Anti rot press     Seated GRN x15 Seated GRN x15                                 Ther Ex             Towel roll edu TC reviewed practiced HEP seated seated       LEODAN at sink 2x10 2x10 2x10 2x10 Hold        SGIS at wall R  2x10 2x10 2x10 x15 x15       Seated QL stretch L     :05 2x15 :05 x15                                                                        Ther Activity             2 MWT  done           Hallway walk   4' w/u 4' w/u 5' w/u        Gait Training                                       Modalities

## 2023-03-08 NOTE — PROGRESS NOTES
Daily Note     Today's date: 3/9/2023  Patient name: Magali Shaw  : 1950  MRN: [id number]  Referring provider: Antwan Anderson, *  Dx:   Encounter Diagnosis     ICD-10-CM    1  Acute midline thoracic back pain  M54 6       2  Chronic midline low back pain with sciatica, sciatica laterality unspecified  M54 40     G89 29           Start Time: 0945  Stop Time: 1029  Total time in clinic (min): 44 minutes    Subjective: Is feeling good  Exercises are going well  Was able to find a place to use TB rows at home  Objective: See treatment diary below    Assessment: Tolerated treatment well, active warm up on UBE for stabilization  Patient demonstrated improved static balance today, able to maintain balance with EC with WBOS  Improved technique with HEP exercises  Progressed LE strengthening with resistance without difficulty  R hip started to hurt after 5 min walking; relieved with SGIS exercise  Attempted walking with SPC in L UE, able to walk without pain in R hip  Patient demonstrated fatigue post treatment and would benefit from continued PT  Plan: Continue per plan of care        Precautions: osteoporosis/osteopenia, DM, fall risk  HEP:  (2/15)3BTPRJWR   RE: (3/15)  POC: (5/15)  FOTO: 47 (2/15)    Manuals 2/15 2/20 2/22 2/27 3/2 3/6 3/9                                                          Neuro Re-Ed             Stand UBE foam reverse      6 min 5 min      Core brace  Brace + March  Brace + OH UE BL Flex  :05x15  x10 alt :05x10 sit  x10 alt sit :05x10 sit  x10 alt sit :90 sit  :90 sit :90 sit  :90 sit  :90 sit   :90 sit  :90 sit      Hip add ball  :05x15 :05x15 sit Yel SB :05x15 sit Yel SB :05x15 sit Yel SB :05x15 sit Yel SB :03x20 sit Yel SB      Static Balance  FT EO  FT EC  Mod tandem  Squats  csmi  :30  2x :15  2x :30 ea  x10 csmi foam  2x:30  2x:30  :30 ea  x6 knee p! csmi foam  2x:30  2x:30  :30 ea  x10 csmi foam  :60  :60  :30 ea  x10 csmi foam  :60   2x :60  :60 ea  x10 csmi foam  :60   2x :60  :60 ea        scap 1, 3   Seated red x20ea Seated red x20ea Seated GRN x20 Seated GRN x20 Seated GRN x20      scap retraction   x20 at wall x20 at wall x20 at wall x20 at wall x20 at wall      Anti rot press     Seated GRN x15 Seated GRN x15 Seated GRN x15                                Ther Ex             Towel roll edu TC reviewed practiced HEP seated seated       LEODAN at sink 2x10 2x10 2x10 2x10 Hold        SGIS at wall R  2x10 2x10 2x10 x15 x15 x15      Seated QL stretch L     :05 2x15 :05 x15 :05 x15      Seated hip abd       GRN 2', 3" hold, slow ecc                                                          Ther Activity             2 MWT  done           Hallway walk   4' w/u 4' w/u 5' w/u  5 min SPC R; 3 min SPC L      Gait Training                                       Modalities

## 2023-03-09 ENCOUNTER — OFFICE VISIT (OUTPATIENT)
Dept: PHYSICAL THERAPY | Facility: CLINIC | Age: 73
End: 2023-03-09

## 2023-03-09 DIAGNOSIS — G89.29 CHRONIC MIDLINE LOW BACK PAIN WITH SCIATICA, SCIATICA LATERALITY UNSPECIFIED: ICD-10-CM

## 2023-03-09 DIAGNOSIS — M54.6 ACUTE MIDLINE THORACIC BACK PAIN: Primary | ICD-10-CM

## 2023-03-09 DIAGNOSIS — M54.40 CHRONIC MIDLINE LOW BACK PAIN WITH SCIATICA, SCIATICA LATERALITY UNSPECIFIED: ICD-10-CM

## 2023-03-10 NOTE — PROGRESS NOTES
Daily Note     Today's date: 3/13/2023  Patient name: Cesario Castle  : 1950  MRN: [id number]  Referring provider: Hamida Adhikari, *  Dx:   Encounter Diagnosis     ICD-10-CM    1  Acute midline thoracic back pain  M54 6       2  Chronic midline low back pain with sciatica, sciatica laterality unspecified  M54 40     G89 29           Start Time: 0940  Stop Time: 1024  Total time in clinic (min): 44 minutes    Subjective: Feels PT has been helping  Will return to MD in first week of April  Did a lot of shopping yesterday, tolerates well with use of cart  Cut back on mm relaxer a little bit  Objective: See treatment diary below    Assessment: Tolerated treatment well, active warm up on UBE for stabilization/balance  Demonstrated improved tolerance to hallway walk today, no complaint of pain  Tolerated progression of resistance with strengthening exercises well  Added resisted walk backs to challenge stability and balance, patient tolerated well, no increase in pain  Overall patient is making good progress toward her objective and functional goals  Patient demonstrated fatigue post treatment and would benefit from continued PT  Plan: Continue per plan of care  Re-Evaluation next visit        Precautions: osteoporosis/osteopenia, DM, fall risk  HEP:  (2/15)3BTPRJWR   RE: (3/15)  POC: (5/15)  FOTO: 47 (2/15)    Manuals 2/15 2/20 2/22 2/27 3/2 3/6 3/9 3/13                                                         Neuro Re-Ed             Stand UBE foam reverse      6 min 5 min 5 min     Core brace  Brace + March  Brace + OH UE BL Flex  :05x15  x10 alt :05x10 sit  x10 alt sit :05x10 sit  x10 alt sit :90 sit  :90 sit :90 sit  :90 sit  :90 sit   :90 sit  :90 sit    :90 GRN  2' w/SB     Hip add ball  :05x15 :05x15 sit Yel SB :05x15 sit Yel SB :05x15 sit Yel SB :05x15 sit Yel SB :03x20 sit Yel SB :03x20 sit Yel SB     Static Balance  FT EO  FT EC  Mod tandem  Squats  Tandem  csmi  :30  2x :15  2x :30 ea  x10 csmi foam  2x:30  2x:30  :30 ea  x6 knee p! csmi foam  2x:30  2x:30  :30 ea  x10 csmi foam  :60  :60  :30 ea  x10 csmi foam  :60   2x :60  :60 ea  x10 csmi foam  :60   2x :60  :60 ea   foam  :45  :30  :60 ea    Firm :30e     scap 1, 3   Seated red x20ea Seated red x20ea Seated GRN x20 Seated GRN x20 Seated GRN x20 Seated Blue x20     scap retraction   x20 at wall x20 at wall x20 at wall x20 at wall x20 at wall x20 at wall     Anti rot press     Seated GRN x15 Seated GRN x15 Seated GRN x15 Seated Blue x20                               Ther Ex             Towel roll edu TC reviewed practiced HEP seated seated       LEODAN at sink 2x10 2x10 2x10 2x10 Hold        SGIS at wall R  2x10 2x10 2x10 x15 x15 x15 x15     Seated QL stretch L     :05 2x15 :05 x15 :05 x15 :05 x15     Seated hip abd       GRN 2', 3" hold, slow ecc GRN 2', 3" hold, slow ecc     Sit to Stand        *x8 feet out     [place]:   -walk backs        *15# x5                               Ther Activity             2 MWT  done           Hallway walk   4' w/u 4' w/u 5' w/u  5 min SPC R; 3 min SPC L 3' [facility] R/L     Gait Training                                       Modalities

## 2023-03-13 ENCOUNTER — OFFICE VISIT (OUTPATIENT)
Dept: PHYSICAL THERAPY | Facility: CLINIC | Age: 73
End: 2023-03-13

## 2023-03-13 DIAGNOSIS — G89.29 CHRONIC MIDLINE LOW BACK PAIN WITH SCIATICA, SCIATICA LATERALITY UNSPECIFIED: ICD-10-CM

## 2023-03-13 DIAGNOSIS — M54.40 CHRONIC MIDLINE LOW BACK PAIN WITH SCIATICA, SCIATICA LATERALITY UNSPECIFIED: ICD-10-CM

## 2023-03-13 DIAGNOSIS — M54.6 ACUTE MIDLINE THORACIC BACK PAIN: Primary | ICD-10-CM

## 2023-03-14 NOTE — PROGRESS NOTES
PT Re-Evaluation     Today's date: 3/16/2023  Patient name: Summer Stephens  : 1950  MRN: [id number]  Referring provider: Caio Graham, *  Dx:   Encounter Diagnosis     ICD-10-CM    1  Acute midline thoracic back pain  M54 6       2  Chronic midline low back pain with sciatica, sciatica laterality unspecified  M54 40     G89 29           Start Time: 9408  Stop Time: 1030  Total time in clinic (min): 48 minutes    Assessment  Patient was evaluated on 2/15/23 for acute thoracic back pain/chronic low back pain  Patient has reported a 65% improvement overall and FOTO outcome measure has improved from 47 to 67  Treatment has consisted of the following: core strengthening/stabilizaiton, AROM, passive stretching, LE strengthening, balance/gait training  Remaining impairments include back pain, PF standing weakness, SL strength/stability/balance which continues to limit his/her ability to be on feet for long durations  Patient continues to show favorable response to treatment and will continue to benefit from skilled physical therapy interventions  Expected duration of treatment consists of 2 visits per week for 2-4 weeks with transition to home exercise program when appropriate  Assessment details: Patient is a 71 y o  female who reports to outpatient physical therapy with acute thoracic back pain, chronic low back pain  Probable movement impairment diagnosis of lumbar instability resulting in pathoanatomical symptoms of thoracic/lumbar pain, decreased ROM, decreased strength, poor posture, decreased gait tolerance/endurance/posture and limiting ability to complete normal daily activity without pain  Skilled physical therapy is warranted for the application of the interventions found below in the planned intervention section      MDT Provisional Classification: Other: scoliosis/osteopenia   Directional Preference: None    Potential Drivers of Pain and/or Disability: Comorbidities: curvature/osteopenia    Principals of Management:   Education: lumbar roll  Exercise Type: LEODAN (Held)  Exercise Frequency: 3x daily  Other Exercises/Interventions: lumbar roll    Etiologic factors include chronic pain/posture  Prognosis is good given HEP compliance and attendance to physical therapy 2x a week for 8 weeks  Positive prognostic indicators include low irritability on IE  Negative prognostic indicators include chronic pain  Please contact me if you have any questions or recommendations  Thank you for the referral and the opportunity to share in 's care  Patient verbalized understanding of POC, HEP, and return demonstrated HEP  Impairments: abnormal gait, abnormal or restricted ROM, activity intolerance, impaired balance, impaired physical strength, lacks appropriate home exercise program, pain with function, weight-bearing intolerance and poor posture     Goals  STG (4 weeks)  1  Patient will have reported <2/10 pain in back at rest  Progressing (3/10)  2  Improve patient's thoracolumbar AROM to WNL degrees for increased ability to take proper strides during ambulation  MET  3  Increase patient's tandem balance to >30 seconds for increased stability on stairs  MET  4  Decrease TUG time to <14" for decreased risk of falls  MET  LTG (8 weeks)  1  Patient's LE strength will be equal bilaterally for ability to ambulate and return to functional activities at PLOF  Progressing  2  Patient will be able to perform 5x STS with 0/10 pain in back in under 15"  MET  3  Patient will be independent with home exercise program for continued maintenance post PT discharge  Plan  Plan details: Extension based exercise  Postural education  Lumbar Roll  Lumbar stabilization/core strengthening  Balance     Planned therapy interventions: manual therapy, neuromuscular re-education, patient education, therapeutic activities, therapeutic exercise, home exercise program, gait training, balance and postural training  Frequency: 2x week  Duration in weeks: 12  Plan of Care beginning date: 2/15/2023  Plan of Care expiration date: 5/15/2023  Treatment plan discussed with: patient        Subjective Evaluation    Re-Evaluation 3/16/23: Pain at shoulder blade that she came in for is gone  Has moved to center of back, that is still there some but has gotten better  Reports 65-75% improvement since starting PT  Has low endurance with household chores at home  Is sleeping okay now  Infrequent symptoms into legs now, seems to have gotten better  Infrequent pain wrapping around to sides/front anymore  No pain with a deep breath anymore  History of Present Illness  Mechanism of injury: Nazia Alfredo presents to outpatient physical therapy with a referral for low back and thoracic back pain  Age: 71 y o  Referral: GP/PCP    Leisure Activities: reading/craftwork   Functional Limitation for Present Episode: lifting/carrying/housework/vaccuming (poor endurance)/stairs/standing doing crafts  NPRS: 9  Other: asks about black and blue L foot    Symptoms:   Pain Map/Pain Location: Chronic Low back pain; middle of back pain for the last couple of months  Pain is in middle of back and will go around sides  Used to get numbness/tingling into legs  Present Symptoms: middle of back wrapping around; some low back pain into posterior L LE not past knee recently     Symptoms Present Since: chronic low back pain; acute thoracic pain  Symptom Progression: Unchanging  Symptoms Commenced as a Result of: insidious onset  Symptoms at Onset: Back (middle)  Constant Symptoms: Back (middle > low)  Intermittent Symptoms: Low back, Thigh  Worse with: rising, walking, as the day progresses and pm, lifting, carrying, housework   Better With: sitting, resting    Specific Questions:   Disturbed Sleep: yes with new middle of back pain; can get back to sleep  Sleeping Postures: supine, Surface:adjustable bed/recliner  Previous Spinal History: chronic lumbar pain; no history of surgery   Previous Treatments: injections, rhizotomy, PT   Cough/Sneeze/Strain: feels like she has to brace herself; pain with deep breath   Bladder/Bowel: normal  Gait: normal; (curve pushes her front when she walks) uses SPC on R side   Medications: PRN tramadol; gabapentin  General Health/Comorbidities: osteoporosis, knees are bad, DM  Recent/Relevant Surgery: n/a  History of Cancer: yes skin basal cell   Unexplained Weight Loss: no  History of Trauma: no  Imaging: yes; xray  Patient Goals/Expectations: get rid of pain, improve functional activity, improve strength, improve walking         Pain  Current pain rating: 3    Social Support  Steps to enter house: yes  Stairs in house: yes (walls no rail)   Lives in: multiple-level home  Lives with: adult children    Employment status: not working        Objective     General Comments:      Lumbar Comments  POSTURAL OBSERVATION:   Sitting: kyphotic; curvature   Change of Posture: worse with upright sitting  Standing: kyphotic, knees bent   Lateral Shift: Left; scoliotic  Shift Relevant: no  Other Observations/Functional Baselines: scoliosis    NEUROLOGICAL:     REFLEXES: WNL    SENSORY Deficit: WNL    MOTOR Deficit:   Left:   Hip Flexion  (L2): 5/5   Knee Ext (L3): 5/5  Ankle DF (L4): 5/5  Great Toe Ext (L5): 5/5  Ankle PF (S1): 4/5 (13 reps)  Right:   Hip Flexion  (L2): 5/5   Knee Ext (L3): 5/5  Ankle DF (L4): 5/5  Great Toe Ext (L5): 5/5  Ankle PF (S1): 4/5 (15 reps)    NEURODYNAMIC TESTS:    Slump Test: -  SLR Test: np    MOVEMENT LOSS:   Symptoms:   Flexion:  None  none  Extension:  None  Feels twinge coming back to upright  Side [place] R:  None  none  Side Glide L:  None  none  Other:     TEST MOVEMENTS: (During/After/Mechanical Response)  Pretest Standing:   FIS:  no effect  RFIS:  no effect/no effect/  EIS:  centralising  LEODAN:  centralising/better/       Other Movements:     STATIC TESTS: np  Other Tests:     Tu"  5x STS: 15"  FT EO: >30"   FT EC: >30" shaky   Tandem: >30"  SLS: R: 8", L: 2"    2MWT: 4 laps (2 down and back)= 264' 3/16= 5 laps/ 330'      Flowsheet Rows    Flowsheet Row Most Recent Value   PT/OT G-Codes    Current Score 67   Projected Score 60             Precautions: osteoporosis/osteopenia, DM, fall risk  HEP:  (2/15)3BTPRJWR   RE: (3/15)  POC: (5/15)  FOTO: 67     Manuals 2/15 2/20 2/22 2/27 3/2 3/6 3/9 3/13 3/16 RE                                                        Neuro Re-Ed             Stand UBE foam reverse      6 min 5 min 5 min 6 min    Core brace  Brace + March  Brace + OH UE BL Flex  :05x15  x10 alt :05x10 sit  x10 alt sit :05x10 sit  x10 alt sit :90 sit  :90 sit :90 sit  :90 sit  :90 sit   :90 sit  :90 sit    :90 GRN  2' w/SB     Hip add ball  :05x15 :05x15 sit Yel SB :05x15 sit Yel SB :05x15 sit Yel SB :05x15 sit Yel SB :03x20 sit Yel SB :03x20 sit Yel SB     Static Balance  FT EO  FT EC  Mod tandem  Squats  Tandem  csmi  :30  2x :15  2x :30 ea  x10 csmi foam  2x:30  2x:30  :30 ea  x6 knee p! csmi foam  2x:30  2x:30  :30 ea  x10 csmi foam  :60  :60  :30 ea  x10 csmi foam  :60   2x :60  :60 ea  x10 csmi foam  :60   2x :60  :60 ea   foam  :45  :30  :60 ea    Firm :30e Tested all, TUG,     scap 1, 3   Seated red x20ea Seated red x20ea Seated GRN x20 Seated GRN x20 Seated GRN x20 Seated Blue x20 1-3 stand [id number]    scap retraction   x20 at wall x20 at wall x20 at wall x20 at wall x20 at wall x20 at wall x20 seated    Anti rot press     Seated GRN x15 Seated GRN x15 Seated GRN x15 Seated Blue x20 Stand 1 GRN x10                              Ther Ex             Towel roll edu TC reviewed practiced HEP seated seated       LEODAN at sink 2x10 2x10 2x10 2x10 Hold        SGIS at wall R  2x10 2x10 2x10 x15 x15 x15 x15 x15    Seated QL stretch L     :05 2x15 :05 x15 :05 x15 :05 x15 :05 x15    Seated hip abd       GRN 2', 3" hold, slow ecc GRN 2', 3" hold, slow ecc     Sit to Stand        *x8 feet out 2x5    [place]:   -walk backs *15# x5 15# x8                              Ther Activity             2 MWT  done           Hallway walk   4' w/u 4' w/u 5' w/u  5 min SPC R; 3 min SPC L 3' [facility] R/L 2' test    Gait Training                                       Modalities

## 2023-03-16 ENCOUNTER — TELEPHONE (OUTPATIENT)
Dept: PHYSICAL THERAPY | Facility: CLINIC | Age: 73
End: 2023-03-16

## 2023-03-16 ENCOUNTER — EVALUATION (OUTPATIENT)
Dept: PHYSICAL THERAPY | Facility: CLINIC | Age: 73
End: 2023-03-16

## 2023-03-16 DIAGNOSIS — G89.29 CHRONIC MIDLINE LOW BACK PAIN WITH SCIATICA, SCIATICA LATERALITY UNSPECIFIED: ICD-10-CM

## 2023-03-16 DIAGNOSIS — M54.6 ACUTE MIDLINE THORACIC BACK PAIN: Primary | ICD-10-CM

## 2023-03-16 DIAGNOSIS — M54.40 CHRONIC MIDLINE LOW BACK PAIN WITH SCIATICA, SCIATICA LATERALITY UNSPECIFIED: ICD-10-CM

## 2023-03-16 NOTE — LETTER
2023    Daisy Verma  [address] [address]    Patient: Magali Shaw   YOB: 1950   Date of Visit: 3/16/2023     Encounter Diagnosis     ICD-10-CM    1  Acute midline thoracic back pain  M54 6       2  Chronic midline low back pain with sciatica, sciatica laterality unspecified  M54 40     G89 29           Dear Dr Neil Shahid Recipients: Thank you for your recent referral of Magali Shaw  Please review the attached evaluation summary from 's recent visit  Please verify that you agree with the plan of care by signing the attached order  If you have any questions or concerns, please do not hesitate to call  I sincerely appreciate the opportunity to share in the care of one of your patients and hope to have another opportunity to work with you in the near future  Sincerely,    Vinod Pritchett, PT      Referring Provider:      I certify that I have read the below Plan of Care and certify the need for these services furnished under this plan of treatment while under my care  Daisy Verma  [address] [facility]  Via Fax: [phone]          PT Re-Evaluation     Today's date: 3/16/2023  Patient name: Magali Shaw  : 1950  MRN: [id number]  Referring provider: Antwan Anderson, *  Dx:   Encounter Diagnosis     ICD-10-CM    1  Acute midline thoracic back pain  M54 6       2  Chronic midline low back pain with sciatica, sciatica laterality unspecified  M54 40     G89 29           Start Time: 3714  Stop Time: 1030  Total time in clinic (min): 48 minutes    Assessment  Patient was evaluated on 2/15/23 for acute thoracic back pain/chronic low back pain  Patient has reported a 65% improvement overall and FOTO outcome measure has improved from 47 to 67  Treatment has consisted of the following: core strengthening/stabilizaiton, AROM, passive stretching, LE strengthening, balance/gait training    Remaining impairments include back pain, PF standing weakness, SL strength/stability/balance which continues to limit his/her ability to be on feet for long durations  Patient continues to show favorable response to treatment and will continue to benefit from skilled physical therapy interventions  Expected duration of treatment consists of 2 visits per week for 2-4 weeks with transition to home exercise program when appropriate  Assessment details: Patient is a 71 y o  female who reports to outpatient physical therapy with acute thoracic back pain, chronic low back pain  Probable movement impairment diagnosis of lumbar instability resulting in pathoanatomical symptoms of thoracic/lumbar pain, decreased ROM, decreased strength, poor posture, decreased gait tolerance/endurance/posture and limiting ability to complete normal daily activity without pain  Skilled physical therapy is warranted for the application of the interventions found below in the planned intervention section  MDT Provisional Classification: Other: scoliosis/osteopenia   Directional Preference: None    Potential Drivers of Pain and/or Disability: Comorbidities: curvature/osteopenia    Principals of Management:   Education: lumbar roll  Exercise Type: LEODAN (Held)  Exercise Frequency: 3x daily  Other Exercises/Interventions: lumbar roll    Etiologic factors include chronic pain/posture  Prognosis is good given HEP compliance and attendance to physical therapy 2x a week for 8 weeks  Positive prognostic indicators include low irritability on IE  Negative prognostic indicators include chronic pain  Please contact me if you have any questions or recommendations  Thank you for the referral and the opportunity to share in [facility]'s care  Patient verbalized understanding of POC, HEP, and return demonstrated HEP    Impairments: abnormal gait, abnormal or restricted ROM, activity intolerance, impaired balance, impaired physical strength, lacks appropriate home exercise program, pain with function, weight-bearing intolerance and poor posture     Goals  STG (4 weeks)  1  Patient will have reported <2/10 pain in back at rest  Progressing (3/10)  2  Improve patient's thoracolumbar AROM to WNL degrees for increased ability to take proper strides during ambulation  MET  3  Increase patient's tandem balance to >30 seconds for increased stability on stairs  MET  4  Decrease TUG time to <14" for decreased risk of falls  MET  LTG (8 weeks)  1  Patient's LE strength will be equal bilaterally for ability to ambulate and return to functional activities at PLOF  Progressing  2  Patient will be able to perform 5x STS with 0/10 pain in back in under 15"  MET  3  Patient will be independent with home exercise program for continued maintenance post PT discharge  Plan  Plan details: Extension based exercise  Postural education  Lumbar Roll  Lumbar stabilization/core strengthening  Balance  Planned therapy interventions: manual therapy, neuromuscular re-education, patient education, therapeutic activities, therapeutic exercise, home exercise program, gait training, balance and postural training  Frequency: 2x week  Duration in weeks: 12  Plan of Care beginning date: 2/15/2023  Plan of Care expiration date: 5/15/2023  Treatment plan discussed with: patient        Subjective Evaluation    Re-Evaluation 3/16/23: Pain at shoulder blade that she came in for is gone  Has moved to center of back, that is still there some but has gotten better  Reports 65-75% improvement since starting PT  Has low endurance with household chores at home  Is sleeping okay now  Infrequent symptoms into legs now, seems to have gotten better  Infrequent pain wrapping around to sides/front anymore  No pain with a deep breath anymore  History of Present Illness  Mechanism of injury: Abdifatah Tovar presents to outpatient physical therapy with a referral for low back and thoracic back pain  Age: 71 y o     Referral: GP/PCP    Leisure Activities: reading/craftwork   Functional Limitation for Present Episode: lifting/carrying/housework/vaccuming (poor endurance)/stairs/standing doing crafts  NPRS: 9  Other: asks about black and blue L foot    Symptoms:   Pain Map/Pain Location: Chronic Low back pain; middle of back pain for the last couple of months  Pain is in middle of back and will go around sides  Used to get numbness/tingling into legs  Present Symptoms: middle of back wrapping around; some low back pain into posterior L LE not past knee recently     Symptoms Present Since: chronic low back pain; acute thoracic pain  Symptom Progression: Unchanging  Symptoms Commenced as a Result of: insidious onset  Symptoms at Onset: Back (middle)  Constant Symptoms: Back (middle > low)  Intermittent Symptoms: Low back, Thigh  Worse with: rising, walking, as the day progresses and pm, lifting, carrying, housework   Better With: sitting, resting    Specific Questions:   Disturbed Sleep: yes with new middle of back pain; can get back to sleep  Sleeping Postures: supine, Surface:adjustable bed/recliner  Previous Spinal History: chronic lumbar pain; no history of surgery   Previous Treatments: injections, rhizotomy, PT   Cough/Sneeze/Strain: feels like she has to brace herself; pain with deep breath   Bladder/Bowel: normal  Gait: normal; (curve pushes her front when she walks) uses SPC on R side   Medications: PRN tramadol; gabapentin  General Health/Comorbidities: osteoporosis, knees are bad, DM  Recent/Relevant Surgery: n/a  History of Cancer: yes skin basal cell   Unexplained Weight Loss: no  History of Trauma: no  Imaging: yes; xray  Patient Goals/Expectations: get rid of pain, improve functional activity, improve strength, improve walking         Pain  Current pain rating: 3    Social Support  Steps to enter house: yes  Stairs in house: yes (walls no rail)   Lives in: multiple-level home  Lives with: adult children    Employment status: not working        Objective     General Comments:      Lumbar Comments  POSTURAL OBSERVATION:   Sitting: kyphotic; curvature   Change of Posture: worse with upright sitting  Standing: kyphotic, knees bent   Lateral Shift: Left; scoliotic  Shift Relevant: no  Other Observations/Functional Baselines: scoliosis    NEUROLOGICAL:     REFLEXES: WNL    SENSORY Deficit: WNL    MOTOR Deficit:   Left:   Hip Flexion  (L2): 5/5   Knee Ext (L3): 5/5  Ankle DF (L4): 5/5  Great Toe Ext (L5): 5/5  Ankle PF (S1): 4/5 (13 reps)  Right:   Hip Flexion  (L2): 5/5   Knee Ext (L3): 5/5  Ankle DF (L4): 5/5  Great Toe Ext (L5): 5/5  Ankle PF (S1): 4/5 (15 reps)    NEURODYNAMIC TESTS:    Slump Test: -  SLR Test: np    MOVEMENT LOSS:   Symptoms:   Flexion:  None  none  Extension:  None  Feels twinge coming back to upright  Side [place] R:  None  none  Side Glide L:  None  none  Other:     TEST MOVEMENTS: (During/After/Mechanical Response)  Pretest Standing:   FIS:  no effect  RFIS:  no effect/no effect/  EIS:  centralising  LEODAN:  centralising/better/       Other Movements:     STATIC TESTS: np  Other Tests:     Tu"  5x STS: 15"  FT EO: >30"   FT EC: >30" shaky   Tandem: >30"  SLS: R: 8", L: 2"    2MWT: 4 laps (2 down and back)= 264' 3/16= 5 laps/ 330'      Flowsheet Rows    Flowsheet Row Most Recent Value   PT/OT G-Codes    Current Score 67   Projected Score 60            Precautions: osteoporosis/osteopenia, DM, fall risk  HEP:  (2/15)3BTPRJWR   RE: (3/15)  POC: (5/15)  FOTO: 67     Manuals 2/15 2/20 2/22 2/27 3 3 3/9 3/13 3/16 RE                                                        Neuro Re-Ed             Stand UBE foam reverse      6 min 5 min 5 min 6 min    Core brace  Brace + March  Brace + OH UE BL Flex  :05x15  x10 alt :05x10 sit  x10 alt sit :05x10 sit  x10 alt sit :90 sit  :90 sit :90 sit  :90 sit  :90 sit   :90 sit  :90 sit    :90 GRN  2' w/SB     Hip add ball  :05x15 :05x15 sit Yel SB :05x15 sit Yel SB :05x15 sit Yel SB :05x15 sit Yel SB :03x20 sit Yel SB :03x20 sit Yel SB     Static Balance  FT EO  FT EC  Mod tandem  Squats  Tandem  csmi  :30  2x :15  2x :30 ea  x10 csmi foam  2x:30  2x:30  :30 ea  x6 knee p! csmi foam  2x:30  2x:30  :30 ea  x10 csmi foam  :60  :60  :30 ea  x10 csmi foam  :60   2x :60  :60 ea  x10 csmi foam  :60   2x :60  :60 ea   foam  :45  :30  :60 ea    Firm :30e Tested all, TUG,     scap 1, 3   Seated red x20ea Seated red x20ea Seated GRN x20 Seated GRN x20 Seated GRN x20 Seated Blue x20 1-3 stand [id number]    scap retraction   x20 at wall x20 at wall x20 at wall x20 at wall x20 at wall x20 at wall x20 seated    Anti rot press     Seated GRN x15 Seated GRN x15 Seated GRN x15 Seated Blue x20 Stand 1 GRN x10                              Ther Ex             Towel roll edu TC reviewed practiced HEP seated seated       LEODAN at sink 2x10 2x10 2x10 2x10 Hold        SGIS at wall R  2x10 2x10 2x10 x15 x15 x15 x15 x15    Seated QL stretch L     :05 2x15 :05 x15 :05 x15 :05 x15 :05 x15    Seated hip abd       GRN 2', 3" hold, slow ecc GRN 2', 3" hold, slow ecc     Sit to Stand        *x8 feet out 2x5    [place]:   -walk backs        *15# x5 15# x8                              Ther Activity             2 MWT  done           Hallway walk   4' w/u 4' w/u 5' w/u  5 min SPC R; 3 min SPC L 3' [facility] R/L 2' test    Gait Training                                       Modalities

## 2023-03-20 ENCOUNTER — OFFICE VISIT (OUTPATIENT)
Dept: PHYSICAL THERAPY | Facility: CLINIC | Age: 73
End: 2023-03-20

## 2023-03-20 DIAGNOSIS — G89.29 CHRONIC MIDLINE LOW BACK PAIN WITH SCIATICA, SCIATICA LATERALITY UNSPECIFIED: ICD-10-CM

## 2023-03-20 DIAGNOSIS — M54.6 ACUTE MIDLINE THORACIC BACK PAIN: Primary | ICD-10-CM

## 2023-03-20 DIAGNOSIS — M54.40 CHRONIC MIDLINE LOW BACK PAIN WITH SCIATICA, SCIATICA LATERALITY UNSPECIFIED: ICD-10-CM

## 2023-03-20 NOTE — PROGRESS NOTES
Daily Note     Today's date: 3/20/2023  Patient name: Jamari Crespo  : 1950  MRN: [id number]  Referring provider: Ernst Jamil, *  Dx:   Encounter Diagnosis     ICD-10-CM    1  Acute midline thoracic back pain  M54 6       2  Chronic midline low back pain with sciatica, sciatica laterality unspecified  M54 40     G89 29           Start Time: 1445          Subjective: Patient states her back still hurts (2-3), but not bad  Her mid back is only 2/10 pain  Objective: See treatment diary below      Assessment: Tolerated treatment well  Patient would benefit from continued PT for stretching and strengthening  Patient was able to increase some of her exercises during today's session  Patient felt good when she left department  She had no pain in her thoracic area by the end of the session  Plan: Continue per plan of care  Progress treatment as tolerated         Precautions: osteoporosis/osteopenia, DM, fall risk  HEP:  (2/15)3BTPRJWR   RE: (3/15)  POC: (5/15)  FOTO: 67     Manuals 2/15 2/20 2/22 2/27 3/2 3/6 3/9 3/13 3/16 RE 3/20                                                       Neuro Re-Ed             Stand UBE foam reverse      6 min 5 min 5 min 6 min 120/70 x6 min   Core brace  Brace + March  Brace + OH UE BL Flex  :05x15  x10 alt :05x10 sit  x10 alt sit :05x10 sit  x10 alt sit :90 sit  :90 sit :90 sit  :90 sit  :90 sit   :90 sit  :90 sit    :90 GRN  2' w/SB    :80 GRN  x2' SB yel   Hip add ball  :05x15 :05x15 sit Yel SB :05x15 sit Yel SB :05x15 sit Yel SB :05x15 sit Yel SB :03x20 sit Yel SB :03x20 sit Yel SB  :03x20 sit Yel SB   Static Balance  FT EO  FT EC  Mod tandem  Squats  Tandem  csmi  :30  2x :15  2x :30 ea  x10 csmi foam  2x:30  2x:30  :30 ea  x6 knee p! csmi foam  2x:30  2x:30  :30 ea  x10 csmi foam  :60  :60  :30 ea  x10 csmi foam  :60   2x :60  :60 ea  x10 csmi foam  :60   2x :60  :60 ea   foam  :45  :30  :60 ea    Firm :30e Tested all, TUG,     scap 1, 3   Seated red x20ea Seated red x20ea Seated GRN x20 Seated GRN x20 Seated GRN x20 Seated Blue x20 1-3 stand [id number] 1-3 stand [id number]   scap retraction   x20 at wall x20 at wall x20 at wall x20 at wall x20 at wall x20 at wall x20 seated Seated x20   Anti rot press     Seated GRN x15 Seated GRN x15 Seated GRN x15 Seated Blue x20 Stand 1 GRN x10 Stand 1 GRN x10                             Ther Ex             Towel roll edu TC reviewed practiced HEP seated seated       LEODAN at sink 2x10 2x10 2x10 2x10 Hold        SGIS at wall R  2x10 2x10 2x10 x15 x15 x15 x15 x15 x15   Seated QL stretch L     :05 2x15 :05 x15 :05 x15 :05 x15 :05 x15 :05x15   Seated hip abd       GRN 2', 3" hold, slow ecc GRN 2', 3" hold, slow ecc  GRN 2', 3" hold, slow ecc   Sit to Stand        *x8 feet out 2x5 2x5   [place]:   -walk backs        *15# x5 15# x8 15# x10                             Ther Activity             2 MWT  done           Hallway walk   4' w/u 4' w/u 5' w/u  5 min SPC R; 3 min SPC L 3' [facility] R/L 2' test    Gait Training                                       Modalities

## 2023-03-23 ENCOUNTER — OFFICE VISIT (OUTPATIENT)
Dept: PHYSICAL THERAPY | Facility: CLINIC | Age: 73
End: 2023-03-23

## 2023-03-23 DIAGNOSIS — G89.29 CHRONIC MIDLINE LOW BACK PAIN WITH SCIATICA, SCIATICA LATERALITY UNSPECIFIED: ICD-10-CM

## 2023-03-23 DIAGNOSIS — M54.40 CHRONIC MIDLINE LOW BACK PAIN WITH SCIATICA, SCIATICA LATERALITY UNSPECIFIED: ICD-10-CM

## 2023-03-23 DIAGNOSIS — M54.6 ACUTE MIDLINE THORACIC BACK PAIN: Primary | ICD-10-CM

## 2023-03-23 NOTE — PROGRESS NOTES
Daily Note     Today's date: 3/23/2023  Patient name: Jasmin Christianson  : 1950  MRN: [id number]  Referring provider: Kalani Payne, *  Dx:   Encounter Diagnosis     ICD-10-CM    1  Acute midline thoracic back pain  M54 6       2  Chronic midline low back pain with sciatica, sciatica laterality unspecified  M54 40     G89 29           Start Time: 1316          Subjective: Patient states she only has a little pain here and there  Presently she has no pain  Objective: See treatment diary below      Assessment: Tolerated treatment well  Patient would benefit from continued PT for stretching and strengthening  Patient performed her exercises with little difficulty and discomfort  She needed few verbal cues for proper posture during exercises  She has a tendency to return back to shifted posture the longer she is standing  Encouraged to do her shifting more over the weekend for home exercises  Patient felt good leaving department and had no pain  Plan: Continue per plan of care  Progress treatment as tolerated         Precautions: osteoporosis/osteopenia, DM, fall risk  HEP:  (2/15)3BTPRJWR   RE: (3/15)  POC: (5/15)  FOTO: 67     Manuals 3/23  2/22 2/27 3/2 3/6 3/9 3/13 3/16 RE 3/20                                                       Neuro Re-Ed             Stand UBE foam reverse 120/70 x6 min     6 min 5 min 5 min 6 min 120/70 x6 min   Core brace  Brace + March  Brace + OHUE BL Flex    :80 grn  x2' SB yel  :05x10 sit  x10 alt sit :05x10 sit  x10 alt sit :90 sit  :90 sit :90 sit  :90 sit  :90 sit   :90 sit  :90 sit    :90 GRN  2' w/SB    :80 GRN  x2' SB yel   Hip add ball :03x20 sit Yel SB  :05x15 sit Yel SB :05x15 sit Yel SB :05x15 sit Yel SB :05x15 sit Yel SB :03x20 sit Yel SB :03x20 sit Yel SB  :03x20 sit Yel SB   Static Balance  FT EO  FT EC  Mod tandem  Squats  Tandem   csmi foam  2x:30  2x:30  :30 ea  x6 knee p! csmi foam  2x:30  2x:30  :30 ea  x10 csmi foam  :60  :60  :30 ea  x10 csmi foam  :60   2x :60  :60 ea  x10 csmi foam  :60   2x :60  :60 ea   foam  :45  :30  :60 ea    Firm :30e Tested all, TUG,     scap 1, 3 1-3 stand [id number]  Seated red x20ea Seated red x20ea Seated GRN x20 Seated GRN x20 Seated GRN x20 Seated Blue x20 1-3 stand [id number] 1-3 stand [id number]   scap retraction Seated x20  x20 at wall x20 at wall x20 at wall x20 at wall x20 at wall x20 at wall x20 seated Seated x20   Anti rot press Stand 1H grn x10    Seated GRN x15 Seated GRN x15 Seated GRN x15 Seated Blue x20 Stand 1 GRN x10 Stand 1 GRN x10                             Ther Ex             Towel roll edu   practiced HEP seated seated       LEODAN at sink   2x10 2x10 Hold        SGIS at wall R x15  2x10 2x10 x15 x15 x15 x15 x15 x15   Seated QL stretch L :05x15    :05 2x15 :05 x15 :05 x15 :05 x15 :05 x15 :05x15   Seated hip abd GRN 2', 3" hold, slow ecc      GRN 2', 3" hold, slow ecc GRN 2', 3" hold, slow ecc  GRN 2', 3" hold, slow ecc   Sit to Stand 2x5       *x8 feet out 2x5 2x5   [place]:   -walk backs 15# x10       *15# x5 15# x8 15# x10                             Ther Activity             2 MWT             Hallway walk 3' SPC  4' w/u 4' w/u 5' w/u  5 min SPC R; 3 min SPC L 3' [facility] R/L 2' test    Gait Training                                       Modalities

## 2023-03-27 ENCOUNTER — OFFICE VISIT (OUTPATIENT)
Dept: PHYSICAL THERAPY | Facility: CLINIC | Age: 73
End: 2023-03-27

## 2023-03-27 DIAGNOSIS — G89.29 CHRONIC MIDLINE LOW BACK PAIN WITH SCIATICA, SCIATICA LATERALITY UNSPECIFIED: ICD-10-CM

## 2023-03-27 DIAGNOSIS — M54.40 CHRONIC MIDLINE LOW BACK PAIN WITH SCIATICA, SCIATICA LATERALITY UNSPECIFIED: ICD-10-CM

## 2023-03-27 DIAGNOSIS — M54.6 ACUTE MIDLINE THORACIC BACK PAIN: Primary | ICD-10-CM

## 2023-03-27 NOTE — PROGRESS NOTES
Daily Note     Today's date: 3/27/2023  Patient name: Ruby Calderon  : 1950  MRN: [id number]  Referring provider: Helio Quintero, *  Dx:   Encounter Diagnosis     ICD-10-CM    1  Acute midline thoracic back pain  M54 6       2  Chronic midline low back pain with sciatica, sciatica laterality unspecified  M54 40     G89 29           Start Time: 945  Stop Time: 1027  Total time in clinic (min): 42 minutes    Subjective: Back has been okay, had one night in the middle of the night where the back would not calm down, did exercises which helped, now it is pretty much gone  Exercises have been going well  Objective: See treatment diary below    Assessment: Tolerated treatment well, active warm up on UBE for stabilization  Progressed static/dynamic balance today, increased SL stance practice  Max cues needed for 3 way hip, demonstrates weak hip abductors, tends to ER hip during abduction/extension  Demonstrates good technique with home exercise routine  Progressed dynamic balance/SL stance with edward step overs, tolerated well  Progressed jonny resistance activity with rows  Patient demonstrated fatigue post treatment and would benefit from continued PT  Plan: Continue per plan of care  Progress functional strengthening as tolerated        Precautions: osteoporosis/osteopenia, DM, fall risk  HEP:  (2/15)3BTPRJWR   RE: (3/15)  POC: (5/15)  FOTO: 67   Next MD appt: 23    Manuals 3/23 3/27  2/27 3/2 3/6 3/9 3/13 3/16 RE 3/20                                                       Neuro Re-Ed             Stand UBE foam reverse 120/70 x6 min 120/70 x6 min    6 min 5 min 5 min 6 min 120/70 x6 min   Core brace  Brace + March  Brace + OHUE BL Flex    :80 grn  x2' SB yel   :05x10 sit  x10 alt sit :90 sit  :90 sit :90 sit  :90 sit  :90 sit   :90 sit  :90 sit    :90 GRN  2' w/SB    :80 GRN  x2' SB yel   Hip add ball :03x20 sit Yel SB   :05x15 sit Yel SB :05x15 sit Yel SB :05x15 sit Yel SB :03x20 sit Yel "SB :03x20 sit Yel SB  :03x20 sit Yel SB   Static Balance  FT EO  FT EC  Mod tandem  Squats  Tandem  SL stance  csmi firm    :30        :30x3 ea  csmi foam  2x:30  2x:30  :30 ea  x10 csmi foam  :60  :60  :30 ea  x10 csmi foam  :60   2x :60  :60 ea  x10 csmi foam  :60   2x :60  :60 ea   foam  :45  :30  :60 ea    Firm :30e Tested all, TUG,     scap 1, 3 1-3 stand [id number]   Seated red x20ea Seated GRN x20 Seated GRN x20 Seated GRN x20 Seated Blue x20 1-3 stand [id number] 1-3 stand [id number]   scap retraction Seated x20 x20 at wall  x20 at wall x20 at wall x20 at wall x20 at wall x20 at wall x20 seated Seated x20   Anti rot press Stand 1H grn x10    Seated GRN x15 Seated GRN x15 Seated GRN x15 Seated Blue x20 Stand 1 GRN x10 Stand 1 GRN x10   Foam marching  2x :60           Multifidi table  x15                                     Ther Ex             Towel roll edu    HEP seated seated       LEODAN at sink    2x10 Hold        SGIS at wall R x15 x15  2x10 x15 x15 x15 x15 x15 x15   Seated QL stretch L :05x15 :05x15   :05 2x15 :05 x15 :05 x15 :05 x15 :05 x15 :05x15   Seated hip abd GRN 2', 3\" hold, slow ecc      GRN 2', 3\" hold, slow ecc GRN 2', 3\" hold, slow ecc  GRN 2', 3\" hold, slow ecc   Sit to Stand 2x5 X5=20\" no UEs      *x8 feet out 2x5 2x5   [place]:   -walk backs  -rows 15# x10 20# x10    10# x15      *15# x5 15# x8 15# x10   3 way hip  Red x10e                        Ther Activity             2 MWT             Hallway walk 3' SPC   4' w/u 5' w/u  5 min SPC R; 3 min SPC L 3' SPC R/L 2' test    Hurdles x5 SPC  Fwd x4   Lat x4                        Gait Training                                       Modalities                                            "

## 2023-03-30 ENCOUNTER — APPOINTMENT (OUTPATIENT)
Dept: PHYSICAL THERAPY | Facility: CLINIC | Age: 73
End: 2023-03-30

## 2023-03-30 NOTE — PROGRESS NOTES
"Daily Note     Today's date: 3/30/2023  Patient name: Kendra Hurd  : 1950  MRN: [id number]  Referring provider: Rosetta Henderson, *  Dx: No diagnosis found  Subjective: ***    Objective: See treatment diary below    Assessment: Tolerated treatment {Tolerated treatment :[id number]}   Patient {assessment:[id number]}    Plan: {PLAN:1591479835}     Precautions: osteoporosis/osteopenia, DM, fall risk  HEP:  (2/15)3BTPRJWR   RE: (3/15)  POC: (5/15)  FOTO: 67   Next MD appt: 23    Manuals 3/23 3/27 3/30   3/6 3/9 3/13 3/16 RE 3/20                                                       Neuro Re-Ed             Stand UBE foam reverse 120/70 x6 min 120/70 x6 min    6 min 5 min 5 min 6 min 120/70 x6 min   Core brace  Brace + March  Brace + OHUE BL Flex    :80 grn  x2' SB yel     :90 sit  :90 sit  :90 sit   :90 sit  :90 sit    :90 GRN  2' w/SB    :80 GRN  x2' SB yel   Hip add ball :03x20 sit Yel SB     :05x15 sit Yel SB :03x20 sit Yel SB :03x20 sit Yel SB  :03x20 sit Yel SB   Static Balance  FT EO  FT EC  Mod tandem  Squats  Tandem  SL stance  csmi firm    :30        :30x3 ea    csmi foam  :60   2x :60  :60 ea  x10 csmi foam  :60   2x :60  :60 ea   foam  :45  :30  :60 ea    Firm :30e Tested all, TUG,     scap 1, 3 1-3 stand [id number]     Seated GRN x20 Seated GRN x20 Seated Blue x20 1-3 stand [id number] 1-3 stand [id number]   scap retraction Seated x20 x20 at wall    x20 at wall x20 at wall x20 at wall x20 seated Seated x20   Anti rot press Stand 1H grn x10     Seated GRN x15 Seated GRN x15 Seated Blue x20 Stand 1 GRN x10 Stand 1 GRN x10   Foam marching  2x :60           Multifidi table  x15                                     Ther Ex             Towel roll edu      seated       LEODAN at sink             SGIS at wall R x15 x15    x15 x15 x15 x15 x15   Seated QL stretch L :05x15 :05x15    :05 x15 :05 x15 :05 x15 :05 x15 :05x15   Seated hip abd GRN 2', 3\" hold, slow ecc      GRN 2', 3\" hold, slow ecc GRN " "2', 3\" hold, slow ecc  GRN 2', 3\" hold, slow ecc   Sit to Stand 2x5 X5=20\" no UEs      *x8 feet out 2x5 2x5   [place]:   -walk backs  -rows 15# x10 20# x10    10# x15      *15# x5 15# x8 15# x10   3 way hip  Red x10e                        Ther Activity             2 MWT             Hallway walk 3' SPC      5 min SPC R; 3 min SPC L 3' SPC R/L 2' test    Hurdles x5 SPC  Fwd x4   Lat x4                        Gait Training                                       Modalities                                            "

## 2023-04-03 ENCOUNTER — OFFICE VISIT (OUTPATIENT)
Dept: PHYSICAL THERAPY | Facility: CLINIC | Age: 73
End: 2023-04-03

## 2023-04-03 DIAGNOSIS — M54.40 CHRONIC MIDLINE LOW BACK PAIN WITH SCIATICA, SCIATICA LATERALITY UNSPECIFIED: ICD-10-CM

## 2023-04-03 DIAGNOSIS — M54.6 ACUTE MIDLINE THORACIC BACK PAIN: Primary | ICD-10-CM

## 2023-04-03 DIAGNOSIS — G89.29 CHRONIC MIDLINE LOW BACK PAIN WITH SCIATICA, SCIATICA LATERALITY UNSPECIFIED: ICD-10-CM

## 2023-04-03 NOTE — PROGRESS NOTES
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  She sees [facility] on Wednesday and will call after her appointment to let us know if she needs to schedule more formal PT appointments  Objective: See treatment diary below    She is still challenged by balance exercises  Assessment: Tolerated treatment well  Patient would benefit from continued PT for stretching and strengthening  Mackenzie performed her exercises with little difficulty and no pain  She felt ok by the end of the session  Plan: Continue per plan of care  Progress treatment as tolerated         Precautions: osteoporosis/osteopenia, DM, fall risk  HEP:  (2/15)3BTPRJWR   RE: (3/15)  POC: (5/15)  FOTO: 67   Next MD appt: 23    Manuals 3/23 3/27 4/3  3/2 3/6 3/9 3/13 3/16 RE 3/20                                                       Neuro Re-Ed             Stand UBE foam reverse 120/70 x6 min 120/70 x6 min 120/70 x6 min   6 min 5 min 5 min 6 min 120/70 x6 min   Core brace  Brace + March  Brace + OHUE BL Flex    :80 grn  x2' SB yel    :90 sit  :90 sit :90 sit  :90 sit  :90 sit   :90 sit  :90 sit    :90 GRN  2' w/SB    :80 GRN  x2' SB yel   Hip add ball :03x20 sit Yel SB    :05x15 sit Yel SB :05x15 sit Yel SB :03x20 sit Yel SB :03x20 sit Yel SB  :03x20 sit Yel SB   Static Balance  FT EO  FT EC  Mod tandem  Squats  Tandem  SL stance  csmi firm    :30        :30x3 ea csmi firm    :30        :30x3 ea  csmi foam  :60  :60  :30 ea  x10 csmi foam  :60   2x ":60  :60 ea  x10 csmi foam  :60   2x :60  :60 ea   foam  :45  :30  :60 ea    Firm :30e Tested all, TUG,     scap 1, 3 1-3 stand [id number]    Seated GRN x20 Seated GRN x20 Seated GRN x20 Seated Blue x20 1-3 stand [id number] 1-3 stand [id number]   scap retraction Seated x20 x20 at wall x20 at wall  x20 at wall x20 at wall x20 at wall x20 at wall x20 seated Seated x20   Anti rot press Stand 1H grn x10    Seated GRN x15 Seated GRN x15 Seated GRN x15 Seated Blue x20 Stand 1 GRN x10 Stand 1 GRN x10   Foam marching  2x :60 2x :60          Multifidi table  x15 x15                                    Ther Ex             Towel roll edu     seated seated       LEODAN at sink     Hold        SGIS at wall R x15 x15 x15  x15 x15 x15 x15 x15 x15   Seated QL stretch L :05x15 :05x15 :05x15  :05 2x15 :05 x15 :05 x15 :05 x15 :05 x15 :05x15   Seated hip abd GRN 2', 3\" hold, slow ecc      GRN 2', 3\" hold, slow ecc GRN 2', 3\" hold, slow ecc  GRN 2', 3\" hold, slow ecc   Sit to Stand 2x5 X5=20\" no UEs x5 no UE     *x8 feet out 2x5 2x5   [place]:   -walk backs  -rows 15# x10 20# x10    10# x15 20 5# x10    10 5# x20     *15# x5 15# x8 15# x10   3 way hip  Red x10e Red x10 ea                       Ther Activity             2 MWT             Hallway walk 3' SPC    5' w/u  5 min SPC R; 3 min SPC L 3' SPC R/L 2' test    Hurdles x5 SPC  Fwd x4   Lat x4 25ft x4 ea                       Gait Training                                       Modalities                                            "

## 2023-04-06 NOTE — DISCHARGE INSTRUCTIONS
Called left message to call back.  Bmp & BNP ok    Reginaldo Up, APRN - CNP   4/6/2023  8:30 AM EDT       [id number] [address] [facility]neumonia   WHAT YOU NEED TO KNOW:   What is community-acquired pneumonia (CAP)? CAP is a lung infection that you get outside of a hospital or nursing home setting  Your lungs become inflamed and cannot work well  CAP may be caused by bacteria, viruses, or fungi  What increases my risk for CAP? · Chronic lung disease    · Cigarette smoking    · Brain disorders such as stroke, dementia, and cerebral palsy    · Weakened immune system    · Recent surgery or trauma    · Surgery for cancer of the mouth, throat, or neck    · Medical conditions such as diabetes or heart disease    What are the signs and symptoms of CAP?   · Cough that may bring up green, yellow, or bloody mucus    · Fever, chills, or severe shaking    · Shortness of breath    · Breathing and heartbeat that are faster than usual    · Pain in your chest or back when you breathe in or cough    · Fatigue and loss of appetite    · Trouble thinking clearly (especially in elderly people)    How is CAP diagnosed? Your healthcare provider will listen to your lungs for abnormal sounds  You may need a chest x-ray  You may also need any of the following if you are admitted to the hospital:  · CT scan  pictures may show a lung infection or other problems, such as fluid around your lungs  You may be given contrast liquid to help your lungs show up better in the pictures  Tell the healthcare provider if you have ever had an allergic reaction to contrast liquid  · A pulse oximeter  is a device that measures the amount of oxygen in your blood  · Blood and sputum tests  may be done to check for the germ causing your infection  · Bronchoscopy  is a procedure to look inside your airway and learn the cause of your airway or lung condition  A bronchoscope (thin tube with a light) is inserted into your mouth and moved down your throat to your airway  You may be given medicine to numb your throat and help you relax during the procedure   Tissue and fluid may be collected from your airway or lungs to be tested  How is CAP treated? Treatment will depend on what type of germ is causing your CAP, and how bad your symptoms are  You may need antibiotics if your pneumonia is caused by bacteria  Antiviral medicines may be given if you have viral pneumonia  You may need medicines that dilate your bronchial tubes  You may need oxygen if your blood oxygen level is lower than it should be  You may need to be admitted to the hospital if your pneumonia is severe  What can I do to manage CAP?   · Do not smoke or allow others to smoke around you  Nicotine and other chemicals in cigarettes and cigars can cause lung damage  Ask your healthcare provider for information if you currently smoke and need help to quit  E-cigarettes or smokeless tobacco still contain nicotine  Talk to your healthcare provider before you use these products  · Breathe warm, moist air  This helps loosen mucus  Loosely place a warm, wet washcloth over your nose and mouth  A room humidifier may also help make the air moist     · Take deep breaths  Deep breaths help open your airway  Take 2 deep breaths and cough 2 or 3 times every hour  Coughing helps get mucus out of your body  · Drink liquids as directed  Ask your healthcare provider how much liquid to drink each day and which liquids to drink  Liquids help make mucus thin and easier to get out of your body  · Gently tap your chest   This helps loosen mucus so it is easier to cough  Lie with your head lower than your chest several times a day and tap your chest     · Get plenty of rest   Rest helps your body heal     How can I prevent CAP? · Wash your hands often  Wash your hands several times each day  Wash after you use the bathroom, change a child's diaper, and before you prepare or eat food  Use soap and water every time  Rub your soapy hands together, lacing your fingers   Wash the front and back of your hands, and in between your fingers  Use the fingers of one hand to scrub under the fingernails of the other hand  Wash for at least 20 seconds  Rinse with warm, running water for several seconds  Then dry your hands with a clean towel or paper towel  Use hand  that contains alcohol if soap and water are not available  Do not touch your eyes, nose, or mouth without washing your hands first          · Cover a sneeze or cough  Use a tissue that covers your mouth and nose  Throw the tissue away in a trash can right away  Use the bend of your arm if a tissue is not available  Wash your hands well with soap and water or use a hand   Do not stand close to anyone who is sneezing or coughing  · Clean surfaces often  Clean doorknobs, countertops, cell phones, and other surfaces that are touched often  Use a disinfecting wipe, a single-use sponge, or a cloth you can wash and reuse  Use disinfecting  if you do not have wipes  You can create a disinfecting  by mixing 1 part bleach with 10 parts water  · Try to avoid people who have a cold or the flu  If you are sick, stay away from others as much as possible  · Ask about vaccines you may need  You may need a vaccine to help prevent pneumonia  Get an influenza (flu) vaccine every year as soon as recommended, usually in September or October  Your healthcare provider can tell you if you should get any other vaccines, and when to get them  When should I seek immediate care? · You are confused and cannot think clearly  · You have increased trouble breathing  · Your lips or fingernails turn gray or blue  When should I call my doctor? · Your symptoms do not get better, or get worse  · You are urinating less, or not at all  · You have questions or concerns about your condition or care  CARE AGREEMENT:   You have the right to help plan your care  Learn about your health condition and how it may be treated   Discuss treatment options with your healthcare providers to decide what care you want to receive  You always have the right to refuse treatment  The above information is an  only  It is not intended as medical advice for individual conditions or treatments  Talk to your doctor, nurse or pharmacist before following any medical regimen to see if it is safe and effective for you  © Copyright [address] Information is for End User's use only and may not be sold, redistributed or otherwise used for commercial purposes   All illustrations and images included in CareNotes® are the copyrighted property of A D [employer] M [employer]  or [address] [place] [address]
